# Patient Record
Sex: MALE | Race: WHITE | Employment: UNEMPLOYED
[De-identification: names, ages, dates, MRNs, and addresses within clinical notes are randomized per-mention and may not be internally consistent; named-entity substitution may affect disease eponyms.]

---

## 2024-10-21 ENCOUNTER — APPOINTMENT (OUTPATIENT)
Facility: HOSPITAL | Age: 74
DRG: 044 | End: 2024-10-21
Payer: COMMERCIAL

## 2024-10-21 ENCOUNTER — HOSPITAL ENCOUNTER (INPATIENT)
Facility: HOSPITAL | Age: 74
LOS: 7 days | Discharge: NURSING, REHAB OR LONG-TERM CARE | DRG: 044 | End: 2024-10-28
Attending: STUDENT IN AN ORGANIZED HEALTH CARE EDUCATION/TRAINING PROGRAM | Admitting: INTERNAL MEDICINE
Payer: COMMERCIAL

## 2024-10-21 DIAGNOSIS — I61.9 NONTRAUMATIC INTRACEREBRAL HEMORRHAGE, UNSPECIFIED CEREBRAL LOCATION, UNSPECIFIED LATERALITY (HCC): Primary | ICD-10-CM

## 2024-10-21 DIAGNOSIS — I48.0 PAROXYSMAL ATRIAL FIBRILLATION (HCC): ICD-10-CM

## 2024-10-21 DIAGNOSIS — I63.9 CEREBROVASCULAR ACCIDENT (CVA), UNSPECIFIED MECHANISM (HCC): ICD-10-CM

## 2024-10-21 PROBLEM — I61.0 THALAMIC HEMORRHAGE (HCC): Status: ACTIVE | Noted: 2024-10-21

## 2024-10-21 LAB
ALBUMIN SERPL-MCNC: 3.9 G/DL (ref 3.5–5)
ALBUMIN/GLOB SERPL: 0.9 (ref 1.1–2.2)
ALP SERPL-CCNC: 86 U/L (ref 45–117)
ALT SERPL-CCNC: 26 U/L (ref 12–78)
ANION GAP SERPL CALC-SCNC: 3 MMOL/L (ref 2–12)
APTT PPP: 29.2 SEC (ref 22.1–31)
AST SERPL-CCNC: 24 U/L (ref 15–37)
BASOPHILS # BLD: 0.1 K/UL (ref 0–0.1)
BASOPHILS NFR BLD: 0 % (ref 0–1)
BILIRUB SERPL-MCNC: 0.7 MG/DL (ref 0.2–1)
BUN SERPL-MCNC: 14 MG/DL (ref 6–20)
BUN/CREAT SERPL: 11 (ref 12–20)
CALCIUM SERPL-MCNC: 9.4 MG/DL (ref 8.5–10.1)
CHLORIDE SERPL-SCNC: 106 MMOL/L (ref 97–108)
CO2 SERPL-SCNC: 26 MMOL/L (ref 21–32)
CREAT SERPL-MCNC: 1.32 MG/DL (ref 0.7–1.3)
DIFFERENTIAL METHOD BLD: ABNORMAL
EKG ATRIAL RATE: 98 BPM
EKG DIAGNOSIS: NORMAL
EKG P AXIS: 37 DEGREES
EKG P-R INTERVAL: 126 MS
EKG Q-T INTERVAL: 382 MS
EKG QRS DURATION: 78 MS
EKG QTC CALCULATION (BAZETT): 487 MS
EKG R AXIS: 3 DEGREES
EKG T AXIS: 44 DEGREES
EKG VENTRICULAR RATE: 98 BPM
EOSINOPHIL # BLD: 0.1 K/UL (ref 0–0.4)
EOSINOPHIL NFR BLD: 0 % (ref 0–7)
ERYTHROCYTE [DISTWIDTH] IN BLOOD BY AUTOMATED COUNT: 15.7 % (ref 11.5–14.5)
ETHANOL SERPL-MCNC: <10 MG/DL (ref 0–0.08)
GLOBULIN SER CALC-MCNC: 4.4 G/DL (ref 2–4)
GLUCOSE SERPL-MCNC: 97 MG/DL (ref 65–100)
HCT VFR BLD AUTO: 43.9 % (ref 36.6–50.3)
HGB BLD-MCNC: 14.6 G/DL (ref 12.1–17)
IMM GRANULOCYTES # BLD AUTO: 0 K/UL (ref 0–0.04)
IMM GRANULOCYTES NFR BLD AUTO: 0 % (ref 0–0.5)
INR PPP: 1.1 (ref 0.9–1.1)
LYMPHOCYTES # BLD: 1.8 K/UL (ref 0.8–3.5)
LYMPHOCYTES NFR BLD: 16 % (ref 12–49)
MCH RBC QN AUTO: 29 PG (ref 26–34)
MCHC RBC AUTO-ENTMCNC: 33.3 G/DL (ref 30–36.5)
MCV RBC AUTO: 87.3 FL (ref 80–99)
MONOCYTES # BLD: 0.8 K/UL (ref 0–1)
MONOCYTES NFR BLD: 7 % (ref 5–13)
NEUTS SEG # BLD: 8.6 K/UL (ref 1.8–8)
NEUTS SEG NFR BLD: 77 % (ref 32–75)
NRBC # BLD: 0 K/UL (ref 0–0.01)
NRBC BLD-RTO: 0 PER 100 WBC
PLATELET # BLD AUTO: 190 K/UL (ref 150–400)
PMV BLD AUTO: 10.8 FL (ref 8.9–12.9)
POTASSIUM SERPL-SCNC: 4.2 MMOL/L (ref 3.5–5.1)
PROT SERPL-MCNC: 8.3 G/DL (ref 6.4–8.2)
PROTHROMBIN TIME: 11 SEC (ref 9–11.1)
RBC # BLD AUTO: 5.03 M/UL (ref 4.1–5.7)
SODIUM SERPL-SCNC: 135 MMOL/L (ref 136–145)
THERAPEUTIC RANGE: NORMAL SECS (ref 58–77)
WBC # BLD AUTO: 11.3 K/UL (ref 4.1–11.1)

## 2024-10-21 PROCEDURE — 82077 ASSAY SPEC XCP UR&BREATH IA: CPT

## 2024-10-21 PROCEDURE — 2580000003 HC RX 258: Performed by: NURSE PRACTITIONER

## 2024-10-21 PROCEDURE — 93005 ELECTROCARDIOGRAM TRACING: CPT | Performed by: INTERNAL MEDICINE

## 2024-10-21 PROCEDURE — 93010 ELECTROCARDIOGRAM REPORT: CPT | Performed by: INTERNAL MEDICINE

## 2024-10-21 PROCEDURE — 6360000002 HC RX W HCPCS: Performed by: STUDENT IN AN ORGANIZED HEALTH CARE EDUCATION/TRAINING PROGRAM

## 2024-10-21 PROCEDURE — APPNB45 APP NON BILLABLE 31-45 MINUTES

## 2024-10-21 PROCEDURE — 85025 COMPLETE CBC W/AUTO DIFF WBC: CPT

## 2024-10-21 PROCEDURE — 93005 ELECTROCARDIOGRAM TRACING: CPT | Performed by: STUDENT IN AN ORGANIZED HEALTH CARE EDUCATION/TRAINING PROGRAM

## 2024-10-21 PROCEDURE — 96374 THER/PROPH/DIAG INJ IV PUSH: CPT

## 2024-10-21 PROCEDURE — 99285 EMERGENCY DEPT VISIT HI MDM: CPT

## 2024-10-21 PROCEDURE — 36415 COLL VENOUS BLD VENIPUNCTURE: CPT

## 2024-10-21 PROCEDURE — 85610 PROTHROMBIN TIME: CPT

## 2024-10-21 PROCEDURE — 2000000000 HC ICU R&B

## 2024-10-21 PROCEDURE — 80053 COMPREHEN METABOLIC PANEL: CPT

## 2024-10-21 PROCEDURE — 70450 CT HEAD/BRAIN W/O DYE: CPT

## 2024-10-21 PROCEDURE — 85730 THROMBOPLASTIN TIME PARTIAL: CPT

## 2024-10-21 RX ORDER — SODIUM CHLORIDE 0.9 % (FLUSH) 0.9 %
5-40 SYRINGE (ML) INJECTION PRN
Status: DISCONTINUED | OUTPATIENT
Start: 2024-10-21 | End: 2024-10-28 | Stop reason: HOSPADM

## 2024-10-21 RX ORDER — SODIUM CHLORIDE 9 MG/ML
INJECTION, SOLUTION INTRAVENOUS CONTINUOUS
Status: DISCONTINUED | OUTPATIENT
Start: 2024-10-21 | End: 2024-10-22

## 2024-10-21 RX ORDER — ACETAMINOPHEN 325 MG/1
650 TABLET ORAL EVERY 6 HOURS PRN
Status: DISCONTINUED | OUTPATIENT
Start: 2024-10-21 | End: 2024-10-28 | Stop reason: HOSPADM

## 2024-10-21 RX ORDER — ACETAMINOPHEN 650 MG/1
650 SUPPOSITORY RECTAL EVERY 6 HOURS PRN
Status: DISCONTINUED | OUTPATIENT
Start: 2024-10-21 | End: 2024-10-28 | Stop reason: HOSPADM

## 2024-10-21 RX ORDER — ONDANSETRON 2 MG/ML
4 INJECTION INTRAMUSCULAR; INTRAVENOUS EVERY 6 HOURS PRN
Status: DISCONTINUED | OUTPATIENT
Start: 2024-10-21 | End: 2024-10-28 | Stop reason: HOSPADM

## 2024-10-21 RX ORDER — SODIUM CHLORIDE 0.9 % (FLUSH) 0.9 %
5-40 SYRINGE (ML) INJECTION EVERY 12 HOURS SCHEDULED
Status: DISCONTINUED | OUTPATIENT
Start: 2024-10-21 | End: 2024-10-28 | Stop reason: HOSPADM

## 2024-10-21 RX ORDER — LABETALOL HYDROCHLORIDE 5 MG/ML
10 INJECTION, SOLUTION INTRAVENOUS EVERY 4 HOURS PRN
Status: DISCONTINUED | OUTPATIENT
Start: 2024-10-21 | End: 2024-10-28 | Stop reason: HOSPADM

## 2024-10-21 RX ORDER — SODIUM CHLORIDE 9 MG/ML
INJECTION, SOLUTION INTRAVENOUS PRN
Status: DISCONTINUED | OUTPATIENT
Start: 2024-10-21 | End: 2024-10-28 | Stop reason: HOSPADM

## 2024-10-21 RX ADMIN — NICARDIPINE HYDROCHLORIDE 10 MG/HR: 0.1 INJECTION, SOLUTION INTRAVENOUS at 23:20

## 2024-10-21 RX ADMIN — NICARDIPINE HYDROCHLORIDE 5 MG/HR: 0.1 INJECTION, SOLUTION INTRAVENOUS at 20:56

## 2024-10-21 RX ADMIN — SODIUM CHLORIDE: 9 INJECTION, SOLUTION INTRAVENOUS at 22:31

## 2024-10-21 NOTE — ED TRIAGE NOTES
Patient arrives via EMS via homeless shelter for \"seizure\" like activities. Patient would be laying \"stiff\" and stop and then start talking to staff. No post dictal period noted. Patient denies any complaints. Transported via EMS due to shelter telling him he needed to get his HTN checked.

## 2024-10-22 ENCOUNTER — APPOINTMENT (OUTPATIENT)
Facility: HOSPITAL | Age: 74
DRG: 044 | End: 2024-10-22
Payer: COMMERCIAL

## 2024-10-22 PROBLEM — I63.9 ISCHEMIC STROKE (HCC): Status: ACTIVE | Noted: 2024-10-22

## 2024-10-22 PROBLEM — I10 UNCONTROLLED HYPERTENSION: Status: ACTIVE | Noted: 2024-10-22

## 2024-10-22 PROBLEM — E78.5 HYPERLIPIDEMIA: Status: ACTIVE | Noted: 2024-10-22

## 2024-10-22 PROBLEM — I61.9 NONTRAUMATIC INTRACEREBRAL HEMORRHAGE (HCC): Status: ACTIVE | Noted: 2024-10-22

## 2024-10-22 LAB
AMPHET UR QL SCN: NEGATIVE
ANION GAP SERPL CALC-SCNC: 2 MMOL/L (ref 2–12)
APPEARANCE UR: CLEAR
BACTERIA URNS QL MICRO: NEGATIVE /HPF
BARBITURATES UR QL SCN: NEGATIVE
BENZODIAZ UR QL: NEGATIVE
BILIRUB UR QL: NEGATIVE
BUN SERPL-MCNC: 11 MG/DL (ref 6–20)
BUN/CREAT SERPL: 9 (ref 12–20)
CALCIUM SERPL-MCNC: 8.8 MG/DL (ref 8.5–10.1)
CANNABINOIDS UR QL SCN: NEGATIVE
CHLORIDE SERPL-SCNC: 110 MMOL/L (ref 97–108)
CHOLEST SERPL-MCNC: 176 MG/DL
CO2 SERPL-SCNC: 26 MMOL/L (ref 21–32)
COCAINE UR QL SCN: NEGATIVE
COLOR UR: NORMAL
CREAT SERPL-MCNC: 1.23 MG/DL (ref 0.7–1.3)
ECHO AV AREA PEAK VELOCITY: 2.3 CM2
ECHO AV AREA/BSA PEAK VELOCITY: 1.3 CM2/M2
ECHO AV PEAK GRADIENT: 10 MMHG
ECHO AV PEAK VELOCITY: 1.6 M/S
ECHO AV VELOCITY RATIO: 0.75
ECHO BSA: 1.83 M2
ECHO LA DIAMETER INDEX: 1.8 CM/M2
ECHO LA DIAMETER: 3.2 CM
ECHO LA VOL A-L A4C: 59 ML (ref 18–58)
ECHO LA VOL MOD A4C: 59 ML (ref 18–58)
ECHO LA VOLUME INDEX A-L A4C: 33 ML/M2 (ref 16–34)
ECHO LA VOLUME INDEX MOD A4C: 33 ML/M2 (ref 16–34)
ECHO LV E' LATERAL VELOCITY: 8.3 CM/S
ECHO LV E' SEPTAL VELOCITY: 6.3 CM/S
ECHO LV EF PHYSICIAN: 55 %
ECHO LV FRACTIONAL SHORTENING: 26 % (ref 28–44)
ECHO LV INTERNAL DIMENSION DIASTOLE INDEX: 2.64 CM/M2
ECHO LV INTERNAL DIMENSION DIASTOLIC: 4.7 CM (ref 4.2–5.9)
ECHO LV INTERNAL DIMENSION SYSTOLIC INDEX: 1.97 CM/M2
ECHO LV INTERNAL DIMENSION SYSTOLIC: 3.5 CM
ECHO LV IVSD: 1.1 CM (ref 0.6–1)
ECHO LV MASS 2D: 187.5 G (ref 88–224)
ECHO LV MASS INDEX 2D: 105.4 G/M2 (ref 49–115)
ECHO LV POSTERIOR WALL DIASTOLIC: 1.1 CM (ref 0.6–1)
ECHO LV RELATIVE WALL THICKNESS RATIO: 0.47
ECHO LVOT AREA: 2.8 CM2
ECHO LVOT DIAM: 1.9 CM
ECHO LVOT PEAK GRADIENT: 6 MMHG
ECHO LVOT PEAK VELOCITY: 1.2 M/S
ECHO MV A VELOCITY: 0.84 M/S
ECHO MV E VELOCITY: 0.5 M/S
ECHO MV E/A RATIO: 0.6
ECHO MV E/E' LATERAL: 6.02
ECHO MV E/E' RATIO (AVERAGED): 6.98
ECHO MV E/E' SEPTAL: 7.94
ECHO MV REGURGITANT PEAK GRADIENT: 96 MMHG
ECHO MV REGURGITANT PEAK VELOCITY: 4.9 M/S
ECHO RV INTERNAL DIMENSION: 3 CM
ECHO RV TAPSE: 2.6 CM (ref 1.7–?)
EKG ATRIAL RATE: 111 BPM
EKG DIAGNOSIS: NORMAL
EKG P AXIS: 56 DEGREES
EKG P-R INTERVAL: 128 MS
EKG Q-T INTERVAL: 318 MS
EKG QRS DURATION: 88 MS
EKG QTC CALCULATION (BAZETT): 432 MS
EKG R AXIS: 44 DEGREES
EKG T AXIS: 90 DEGREES
EKG VENTRICULAR RATE: 111 BPM
EPITH CASTS URNS QL MICRO: NORMAL /LPF
ERYTHROCYTE [DISTWIDTH] IN BLOOD BY AUTOMATED COUNT: 15.4 % (ref 11.5–14.5)
EST. AVERAGE GLUCOSE BLD GHB EST-MCNC: 117 MG/DL
GLUCOSE SERPL-MCNC: 117 MG/DL (ref 65–100)
GLUCOSE UR STRIP.AUTO-MCNC: NEGATIVE MG/DL
HBA1C MFR BLD: 5.7 % (ref 4–5.6)
HCT VFR BLD AUTO: 40.9 % (ref 36.6–50.3)
HDLC SERPL-MCNC: 53 MG/DL
HDLC SERPL: 3.3 (ref 0–5)
HGB BLD-MCNC: 13.9 G/DL (ref 12.1–17)
HGB UR QL STRIP: NEGATIVE
HYALINE CASTS URNS QL MICRO: NORMAL /LPF (ref 0–5)
KETONES UR QL STRIP.AUTO: NEGATIVE MG/DL
LDLC SERPL CALC-MCNC: 101.4 MG/DL (ref 0–100)
LEUKOCYTE ESTERASE UR QL STRIP.AUTO: NEGATIVE
Lab: NORMAL
MCH RBC QN AUTO: 29 PG (ref 26–34)
MCHC RBC AUTO-ENTMCNC: 34 G/DL (ref 30–36.5)
MCV RBC AUTO: 85.4 FL (ref 80–99)
METHADONE UR QL: NEGATIVE
NITRITE UR QL STRIP.AUTO: NEGATIVE
NRBC # BLD: 0 K/UL (ref 0–0.01)
NRBC BLD-RTO: 0 PER 100 WBC
OPIATES UR QL: NEGATIVE
PCP UR QL: NEGATIVE
PH UR STRIP: 7 (ref 5–8)
PLATELET # BLD AUTO: 194 K/UL (ref 150–400)
PMV BLD AUTO: 11.2 FL (ref 8.9–12.9)
POTASSIUM SERPL-SCNC: 5.1 MMOL/L (ref 3.5–5.1)
PROT UR STRIP-MCNC: NEGATIVE MG/DL
RBC # BLD AUTO: 4.79 M/UL (ref 4.1–5.7)
RBC #/AREA URNS HPF: NORMAL /HPF (ref 0–5)
SODIUM SERPL-SCNC: 138 MMOL/L (ref 136–145)
SP GR UR REFRACTOMETRY: 1.01 (ref 1–1.03)
TRIGL SERPL-MCNC: 108 MG/DL
TSH SERPL DL<=0.05 MIU/L-ACNC: 0.92 UIU/ML (ref 0.36–3.74)
URINE CULTURE IF INDICATED: NORMAL
UROBILINOGEN UR QL STRIP.AUTO: 0.2 EU/DL (ref 0.2–1)
VLDLC SERPL CALC-MCNC: 21.6 MG/DL
WBC # BLD AUTO: 10.4 K/UL (ref 4.1–11.1)
WBC URNS QL MICRO: NORMAL /HPF (ref 0–4)

## 2024-10-22 PROCEDURE — 93010 ELECTROCARDIOGRAM REPORT: CPT | Performed by: SPECIALIST

## 2024-10-22 PROCEDURE — 85027 COMPLETE CBC AUTOMATED: CPT

## 2024-10-22 PROCEDURE — 92522 EVALUATE SPEECH PRODUCTION: CPT

## 2024-10-22 PROCEDURE — 83036 HEMOGLOBIN GLYCOSYLATED A1C: CPT

## 2024-10-22 PROCEDURE — 93306 TTE W/DOPPLER COMPLETE: CPT | Performed by: SPECIALIST

## 2024-10-22 PROCEDURE — 81001 URINALYSIS AUTO W/SCOPE: CPT

## 2024-10-22 PROCEDURE — 70551 MRI BRAIN STEM W/O DYE: CPT

## 2024-10-22 PROCEDURE — 80307 DRUG TEST PRSMV CHEM ANLYZR: CPT

## 2024-10-22 PROCEDURE — 6360000002 HC RX W HCPCS

## 2024-10-22 PROCEDURE — 97165 OT EVAL LOW COMPLEX 30 MIN: CPT

## 2024-10-22 PROCEDURE — 2580000003 HC RX 258: Performed by: NURSE PRACTITIONER

## 2024-10-22 PROCEDURE — 70450 CT HEAD/BRAIN W/O DYE: CPT

## 2024-10-22 PROCEDURE — 97112 NEUROMUSCULAR REEDUCATION: CPT

## 2024-10-22 PROCEDURE — APPNB45 APP NON BILLABLE 31-45 MINUTES

## 2024-10-22 PROCEDURE — 2060000000 HC ICU INTERMEDIATE R&B

## 2024-10-22 PROCEDURE — 2500000003 HC RX 250 WO HCPCS: Performed by: NURSE PRACTITIONER

## 2024-10-22 PROCEDURE — 92610 EVALUATE SWALLOWING FUNCTION: CPT

## 2024-10-22 PROCEDURE — 99223 1ST HOSP IP/OBS HIGH 75: CPT | Performed by: PSYCHIATRY & NEUROLOGY

## 2024-10-22 PROCEDURE — 80048 BASIC METABOLIC PNL TOTAL CA: CPT

## 2024-10-22 PROCEDURE — 97161 PT EVAL LOW COMPLEX 20 MIN: CPT

## 2024-10-22 PROCEDURE — 6360000002 HC RX W HCPCS: Performed by: STUDENT IN AN ORGANIZED HEALTH CARE EDUCATION/TRAINING PROGRAM

## 2024-10-22 PROCEDURE — 36415 COLL VENOUS BLD VENIPUNCTURE: CPT

## 2024-10-22 PROCEDURE — 84443 ASSAY THYROID STIM HORMONE: CPT

## 2024-10-22 PROCEDURE — 6370000000 HC RX 637 (ALT 250 FOR IP): Performed by: PSYCHIATRY & NEUROLOGY

## 2024-10-22 PROCEDURE — 93306 TTE W/DOPPLER COMPLETE: CPT

## 2024-10-22 PROCEDURE — 6360000002 HC RX W HCPCS: Performed by: NURSE PRACTITIONER

## 2024-10-22 PROCEDURE — 80061 LIPID PANEL: CPT

## 2024-10-22 PROCEDURE — 97535 SELF CARE MNGMENT TRAINING: CPT

## 2024-10-22 RX ORDER — LEVETIRACETAM 500 MG/5ML
500 INJECTION, SOLUTION, CONCENTRATE INTRAVENOUS EVERY 12 HOURS
Status: DISCONTINUED | OUTPATIENT
Start: 2024-10-22 | End: 2024-10-22

## 2024-10-22 RX ORDER — ATORVASTATIN CALCIUM 40 MG/1
40 TABLET, FILM COATED ORAL NIGHTLY
Status: DISCONTINUED | OUTPATIENT
Start: 2024-10-22 | End: 2024-10-28 | Stop reason: HOSPADM

## 2024-10-22 RX ADMIN — NICARDIPINE HYDROCHLORIDE 12.5 MG/HR: 0.1 INJECTION, SOLUTION INTRAVENOUS at 00:05

## 2024-10-22 RX ADMIN — LEVETIRACETAM 500 MG: 100 INJECTION INTRAVENOUS at 01:14

## 2024-10-22 RX ADMIN — SODIUM CHLORIDE, PRESERVATIVE FREE 10 ML: 5 INJECTION INTRAVENOUS at 20:37

## 2024-10-22 RX ADMIN — NICARDIPINE HYDROCHLORIDE 7.5 MG/HR: 0.1 INJECTION, SOLUTION INTRAVENOUS at 06:03

## 2024-10-22 RX ADMIN — LEVETIRACETAM 500 MG: 100 INJECTION INTRAVENOUS at 12:26

## 2024-10-22 RX ADMIN — NICARDIPINE HYDROCHLORIDE 15 MG/HR: 0.1 INJECTION, SOLUTION INTRAVENOUS at 00:53

## 2024-10-22 RX ADMIN — LABETALOL HYDROCHLORIDE 10 MG: 5 INJECTION INTRAVENOUS at 00:05

## 2024-10-22 RX ADMIN — FAMOTIDINE 20 MG: 10 INJECTION, SOLUTION INTRAVENOUS at 00:48

## 2024-10-22 RX ADMIN — ATORVASTATIN CALCIUM 40 MG: 40 TABLET, FILM COATED ORAL at 20:36

## 2024-10-22 RX ADMIN — SODIUM CHLORIDE, PRESERVATIVE FREE 10 ML: 5 INJECTION INTRAVENOUS at 08:28

## 2024-10-22 RX ADMIN — NICARDIPINE HYDROCHLORIDE 14 MG/HR: 0.1 INJECTION, SOLUTION INTRAVENOUS at 02:21

## 2024-10-22 RX ADMIN — NICARDIPINE HYDROCHLORIDE 13 MG/HR: 0.1 INJECTION, SOLUTION INTRAVENOUS at 03:56

## 2024-10-22 ASSESSMENT — PAIN DESCRIPTION - PAIN TYPE: TYPE: ACUTE PAIN

## 2024-10-22 ASSESSMENT — PAIN SCALES - GENERAL
PAINLEVEL_OUTOF10: 0
PAINLEVEL_OUTOF10: 4
PAINLEVEL_OUTOF10: 0
PAINLEVEL_OUTOF10: 0

## 2024-10-22 ASSESSMENT — PAIN DESCRIPTION - ORIENTATION: ORIENTATION: LEFT

## 2024-10-22 ASSESSMENT — PAIN DESCRIPTION - ONSET: ONSET: ON-GOING

## 2024-10-22 ASSESSMENT — PAIN DESCRIPTION - DESCRIPTORS: DESCRIPTORS: ACHING

## 2024-10-22 ASSESSMENT — PAIN - FUNCTIONAL ASSESSMENT: PAIN_FUNCTIONAL_ASSESSMENT: ACTIVITIES ARE NOT PREVENTED

## 2024-10-22 ASSESSMENT — PAIN DESCRIPTION - LOCATION: LOCATION: HEAD

## 2024-10-22 ASSESSMENT — PAIN DESCRIPTION - FREQUENCY: FREQUENCY: CONTINUOUS

## 2024-10-22 NOTE — H&P
Name: Jason Soliman   : 1950   MRN: [id number]   Date: 10/22/2024      REASON FOR ICU ADMISSION: ICH     PRINCIPLE ICU DIAGNOSIS     Acute left thalamic hemorrhage  HTN emergency   Seizure-like activity   Current Smoker    PATIENT SUMMARY   Jason Soliman is a 74 y.o. male with a history of HTN     This is a 74 y.o. male with pmhx HTN who \"presents today for cc of AMS. Patient states that he has been having random falls since yesterday. Per EMS at the homeless shelter he had an episode of stiffening and acted abnormally, then was acting normal. They took his blood pressure and noted it was elevated and recommended that he come in.  Patient states that he otherwise has no complaints, no headache, chest pain.  He did not lose control of bowel or bladder during the episode and was not postictal per EMS.  He did have another fall today, but denies any his head stating \"I just fell over.\"  Denies prodrome or presyncope.  No loss of consciousness.  Takes no blood thinners.\" Head CT was obtained and reported: Acute left thalamic hemorrhage. Patient was started on Cardene drip for blood pressure control. Neurosurgery was consulted and reviewed image, no surgical intervention was recommended. Critical care was consulted for admission. Admit to ICU. Upon further interview patient sates that he has had an intermittent headache that started about a week ago. Has been taking ibuprofen for the headache, but kept coming back which was unusual for him. Currently states his headache is still present but slight 2/10 on pain scale. No other complaint of pain. No nausea or vomiting. No blood in bowel movement or urine. States that he has had chills at night, but no fever. Not recently sick or around sick contacts.     Patient states that has been told before that he has HTN but does not take any medications. States that a few times he has also been told his blood sugars were elevated but not diagnosed with any diabetes. Denies

## 2024-10-22 NOTE — ED PROVIDER NOTES
[facility] EMERGENCY DEP  EMERGENCY DEPARTMENT ENCOUNTER      Pt Name: Jason Soliman  MRN: [id number]  Birthdate 1950  Date of evaluation: 10/21/2024  Provider: Carmen Baker DO    CHIEF COMPLAINT       Chief Complaint   Patient presents with    Altered Mental Status    Hypertension       PM No past medical history on file.      MDM:   Vitals:    Vitals:    10/21/24 2012   BP: (!) 171/114   Pulse:    Resp:    Temp:    SpO2:            This is a 74 y.o. male with pmhx homelessness who presents today for cc of AMS. Patient states that he has been having random falls since yesterday. Per EMS at the homeless shelter he had an episode of stiffening and acted abnormally, then was acting normal. They took his blood pressure and noted it was elevated and recommended that he come in.  Patient states that he otherwise has no complaints, no headache, chest pain.  He did not lose control of bowel or bladder during the episode and was not postictal per EMS.  He did have another fall today but denies any his head stating \"I just fell over.\"  Denies prodrome or presyncope.  No loss of consciousness.  Takes no blood thinners.    On arrival VS stable.   Physical Exam  General: Alert, no acute distress  HEENT: Normocephalic, atraumatic. EOMI, moist oral mucosa, no conjunctival injection  Neck: ROM normal, supple  Cardio: Heart regular rate and rhythm, cap refill <2seconds  Lungs: No respiratory distress, no wheezing, CTAB  Abdomen: Soft, nontender  MSK: ROM normal, no LE edema  Skin: Warm, dry, no rash  Neuro: No focal neurodeficits, Aox3, NIH 0    Labs grossly unremarkable, EKG nonischemic.  CT head obtained showing small left thalamic bleed.    Code stroke level 1 called,teleneuro recommends SBP between 130 and 150 and nicardipine was started as his current systolic is 170. Discussed with Dr. Wang of neurosurgery who recommends ICU admission for q1h neurochecks and rpt HCT in AM. ICU made aware and coming to bedside for

## 2024-10-22 NOTE — CARE COORDINATION
Care Management Initial Assessment       RUR: 10 % Low   Readmission? No     10/22/24 0941   Service Assessment   Patient Orientation Alert and Oriented;Person;Place;Situation;Self   Cognition Alert   History Provided By Patient   Primary Caregiver Self   Support Systems Family Members  (Brother Scout Alfaro [phone])   Patient's Healthcare Decision Maker is: Legal Next of Kin   PCP Verified by CM Yes  (Per patient he saw someone at [facility], does not recall the name)   Last Visit to PCP Within last year   Prior Functional Level Independent in ADLs/IADLs   Current Functional Level Assistance with the following:;Bathing;Dressing;Toileting;Mobility   Can patient return to prior living arrangement Unknown at present   Ability to make needs known: Fair   Family able to assist with home care needs: No   Would you like for me to discuss the discharge plan with any other family members/significant others, and if so, who? Yes  (Brother Scout Alfaro)   Financial Resources Medicaid  ([employer])   Social/Functional History   Lives With Other (comment)  (Lives at the [facility])   Type of Home Facility   Home Equipment None   Receives Help From Family   ADL Assistance Independent   Homemaking Assistance Independent   Ambulation Assistance Independent   Transfer Assistance Independent   Active  No   Patient's  Info Takes the bus , friends and brother also transport   Mode of Transportation Bus;Car   Discharge Planning   Living Arrangements Other (Comment)  ([facility] [address] [address])   Current Services Prior To Admission None   Potential Assistance Purchasing Medications No   Type of Home Care Services None     Patient presented to the ED from [facility] at [address]. Phone [phone] with seizure like activity. CT: acute left thalamic hemorrhage per notes surgery not indicated.   CM met with patient to introduce self and explain role. Per patient

## 2024-10-22 NOTE — H&P
History and Physical    Date of Service:  10/22/2024  Primary Care Provider: No primary care provider on file.  Source of information: The patient and Chart review    Chief Complaint: Altered Mental Status and Hypertension      History of Presenting Illness:   Jason Soliman is a 74 y.o. male with HTN and tobacco use disorder who was [facility] ICU under CCM service from a homeless shelter on 10/21/2024 with acute left thalamic ICH, hypertensive emergency, seizure-like activity. NSGY was consulted in the ED and recommended non-surgical management with repeat CT head. Neurology was consulted. He was started on Keppra 500 BID. SLP recommended regular diet with thin liquids. Repeat head CT 10/22 showed stable L thalamic ICH. NSGY signed off today. OT recommended IPR. PT eval is pending. MRI brain wo contrast 10/22 report is pending. TTE 10/22 showed EF 55-60%, NWM, abnormal diastolic function, mild MR/TR and negative bubble study. Pt is s/p nicardipine gtt 10/21-10/22. BP has improved.  was consulted for transfer of care today.    Pt was seen/examined at bedside in ICU. He had MRI today. He reports continued slurred speech, L facial droop, L-sided weakness. BP is much improved.      REVIEW OF SYSTEMS:  Pertinent items are noted in the History of Present Illness.     Medical hx  HTN     No past surgical history on file.    Prior to Admission medications    Not on File     No Known Allergies     No family history on file.     Social History:   Pt admits to smoking cigarettes.   Social Determinants of Health     Tobacco Use: Not on file   Alcohol Use: Not on file   Financial Resource Strain: Not on file   Food Insecurity: Not on file   Transportation Needs: Not on file   Physical Activity: Not on file   Stress: Not on file   Social Connections: Not on file   Intimate Partner Violence: Not on file   Depression: Not on file   Housing Stability: Not on file   Interpersonal Safety: Not on file   Utilities: Not

## 2024-10-22 NOTE — ED NOTES
This RN was helping Patricia RN with pts heart rate. Pts heart rate increased to 156. Repeat EKG performed. Pt denies CP or SOB. MD aware.

## 2024-10-22 NOTE — ED NOTES
POST FALL MANAGEMENT    Jason Soliman  MEDICAL RECORD NUMBER:  [id number]  AGE: 74 y.o.   GENDER: male  : 1950  TODAYS DATE:  10/21/2024    Details     Fall Occurred: Yes    Was the Fall Witnessed:  No      Brief Review of Event: Patient was discovered outside emergency department by ED Techs in the bushes.          Who found the patient: Patito       Where was the patient at the time of the fall: in the [facility] outside the emergency department       Patient Comments: no comments        Date Fall Occurred:  2024 .       Time Fall Occurred: 7:45p.m.     Assessment     Post Fall Head to Toe Assessment Completed: Yes    Post Fall Predictive Analytic Score Reviewed: No: n/a      Post Fall Vitals Completed: Yes    Post Fall Neuro Checks Completed: Yes    Injury Occurred(if yes, describe injury):  no ( patient denied any injuries)            Did the Patient Experience:(Check Bony all that apply)    ---- patient denies hitting head and LOC   [] Patient hit head  [] Loss of consciousness  [] Change in mental status following the fall  [] Patient is on an anticoagulant medication      CT Performed:  yes    Follow-up     Persons Notified of Fall:  (Provide names of persons notified)   [x] Physician:   [] RUTH:  [x] Nursing Supervisior:  [] Manager:  [] Pharmacist:  [] Family:  [] Other:      Electronically signed by Jun Cuello RN 10/21/2024 at 9:27 PM

## 2024-10-22 NOTE — ED NOTES
TRANSFER - OUT REPORT:    Verbal report given to jared Ramirez  on Jason Soliman  being transferred to [facility] for routine progression of patient care       Report consisted of patient's Situation, Background, Assessment and   Recommendations(SBAR).     Information from the following report(s) Nurse Handoff Report, ED Encounter Summary, Adult Overview, Intake/Output, MAR, Recent Results, Cardiac Rhythm Sinus tach, and Neuro Assessment was reviewed with the receiving nurse.    [place] Fall Assessment:    Presents to emergency department  because of falls (Syncope, seizure, or loss of consciousness): Yes  Age > 70: Yes  Altered Mental Status, Intoxication with alcohol or substance confusion (Disorientation, impaired judgment, poor safety awaremess, or inability to follow instructions): No  Impaired Mobility: Ambulates or transfers with assistive devices or assistance; Unable to ambulate or transer.: No  Nursing Judgement: Yes          Lines:   Peripheral IV 10/21/24 Left;Posterior Hand (Active)       Peripheral IV 10/21/24 Right Forearm (Active)        Opportunity for questions and clarification was provided.      Patient transported with:  Monitor and Registered Nurse

## 2024-10-23 ENCOUNTER — APPOINTMENT (OUTPATIENT)
Facility: HOSPITAL | Age: 74
DRG: 044 | End: 2024-10-23
Payer: COMMERCIAL

## 2024-10-23 ENCOUNTER — TELEPHONE (OUTPATIENT)
Facility: HOSPITAL | Age: 74
End: 2024-10-23

## 2024-10-23 LAB
ANION GAP SERPL CALC-SCNC: 7 MMOL/L (ref 2–12)
BUN SERPL-MCNC: 12 MG/DL (ref 6–20)
BUN/CREAT SERPL: 9 (ref 12–20)
CALCIUM SERPL-MCNC: 9 MG/DL (ref 8.5–10.1)
CHLORIDE SERPL-SCNC: 111 MMOL/L (ref 97–108)
CO2 SERPL-SCNC: 28 MMOL/L (ref 21–32)
CREAT SERPL-MCNC: 1.29 MG/DL (ref 0.7–1.3)
ERYTHROCYTE [DISTWIDTH] IN BLOOD BY AUTOMATED COUNT: 15.7 % (ref 11.5–14.5)
GLUCOSE SERPL-MCNC: 89 MG/DL (ref 65–100)
HCT VFR BLD AUTO: 36.9 % (ref 36.6–50.3)
HGB BLD-MCNC: 12.5 G/DL (ref 12.1–17)
MCH RBC QN AUTO: 28.9 PG (ref 26–34)
MCHC RBC AUTO-ENTMCNC: 33.9 G/DL (ref 30–36.5)
MCV RBC AUTO: 85.4 FL (ref 80–99)
NRBC # BLD: 0 K/UL (ref 0–0.01)
NRBC BLD-RTO: 0 PER 100 WBC
PLATELET # BLD AUTO: 162 K/UL (ref 150–400)
PMV BLD AUTO: 10.9 FL (ref 8.9–12.9)
POTASSIUM SERPL-SCNC: 3.5 MMOL/L (ref 3.5–5.1)
RBC # BLD AUTO: 4.32 M/UL (ref 4.1–5.7)
SODIUM SERPL-SCNC: 146 MMOL/L (ref 136–145)
WBC # BLD AUTO: 7 K/UL (ref 4.1–11.1)

## 2024-10-23 PROCEDURE — 97535 SELF CARE MNGMENT TRAINING: CPT

## 2024-10-23 PROCEDURE — 2060000000 HC ICU INTERMEDIATE R&B

## 2024-10-23 PROCEDURE — 70498 CT ANGIOGRAPHY NECK: CPT

## 2024-10-23 PROCEDURE — 6360000002 HC RX W HCPCS: Performed by: NURSE PRACTITIONER

## 2024-10-23 PROCEDURE — 95816 EEG AWAKE AND DROWSY: CPT

## 2024-10-23 PROCEDURE — 4A03X5D MEASUREMENT OF ARTERIAL FLOW, INTRACRANIAL, EXTERNAL APPROACH: ICD-10-PCS | Performed by: HOSPITALIST

## 2024-10-23 PROCEDURE — 6370000000 HC RX 637 (ALT 250 FOR IP): Performed by: PSYCHIATRY & NEUROLOGY

## 2024-10-23 PROCEDURE — 92526 ORAL FUNCTION THERAPY: CPT

## 2024-10-23 PROCEDURE — 92507 TX SP LANG VOICE COMM INDIV: CPT

## 2024-10-23 PROCEDURE — 2580000003 HC RX 258: Performed by: NURSE PRACTITIONER

## 2024-10-23 PROCEDURE — 6360000004 HC RX CONTRAST MEDICATION: Performed by: RADIOLOGY

## 2024-10-23 PROCEDURE — 85027 COMPLETE CBC AUTOMATED: CPT

## 2024-10-23 PROCEDURE — 95816 EEG AWAKE AND DROWSY: CPT | Performed by: PSYCHIATRY & NEUROLOGY

## 2024-10-23 PROCEDURE — 97530 THERAPEUTIC ACTIVITIES: CPT

## 2024-10-23 PROCEDURE — 6370000000 HC RX 637 (ALT 250 FOR IP): Performed by: HOSPITALIST

## 2024-10-23 PROCEDURE — 80048 BASIC METABOLIC PNL TOTAL CA: CPT

## 2024-10-23 PROCEDURE — 99232 SBSQ HOSP IP/OBS MODERATE 35: CPT | Performed by: NURSE PRACTITIONER

## 2024-10-23 RX ORDER — AMLODIPINE BESYLATE 5 MG/1
5 TABLET ORAL DAILY
Status: DISCONTINUED | OUTPATIENT
Start: 2024-10-23 | End: 2024-10-28 | Stop reason: HOSPADM

## 2024-10-23 RX ORDER — HYDRALAZINE HYDROCHLORIDE 20 MG/ML
10 INJECTION INTRAMUSCULAR; INTRAVENOUS EVERY 6 HOURS PRN
Status: DISCONTINUED | OUTPATIENT
Start: 2024-10-23 | End: 2024-10-28 | Stop reason: HOSPADM

## 2024-10-23 RX ORDER — LOSARTAN POTASSIUM 50 MG/1
25 TABLET ORAL DAILY
Status: DISCONTINUED | OUTPATIENT
Start: 2024-10-23 | End: 2024-10-28 | Stop reason: HOSPADM

## 2024-10-23 RX ORDER — IOPAMIDOL 755 MG/ML
100 INJECTION, SOLUTION INTRAVASCULAR
Status: COMPLETED | OUTPATIENT
Start: 2024-10-23 | End: 2024-10-23

## 2024-10-23 RX ADMIN — SODIUM CHLORIDE, PRESERVATIVE FREE 10 ML: 5 INJECTION INTRAVENOUS at 21:52

## 2024-10-23 RX ADMIN — LOSARTAN POTASSIUM 25 MG: 50 TABLET, FILM COATED ORAL at 14:07

## 2024-10-23 RX ADMIN — LABETALOL HYDROCHLORIDE 10 MG: 5 INJECTION INTRAVENOUS at 00:37

## 2024-10-23 RX ADMIN — HYDRALAZINE HYDROCHLORIDE 10 MG: 20 INJECTION INTRAMUSCULAR; INTRAVENOUS at 06:11

## 2024-10-23 RX ADMIN — SODIUM CHLORIDE, PRESERVATIVE FREE 20 ML: 5 INJECTION INTRAVENOUS at 09:41

## 2024-10-23 RX ADMIN — AMLODIPINE BESYLATE 5 MG: 5 TABLET ORAL at 14:07

## 2024-10-23 RX ADMIN — ATORVASTATIN CALCIUM 40 MG: 40 TABLET, FILM COATED ORAL at 21:50

## 2024-10-23 RX ADMIN — IOPAMIDOL 100 ML: 755 INJECTION, SOLUTION INTRAVENOUS at 16:11

## 2024-10-23 ASSESSMENT — PAIN SCALES - GENERAL: PAINLEVEL_OUTOF10: 0

## 2024-10-23 NOTE — TELEPHONE ENCOUNTER
Pt needs a hospital follow up appointment  Provider: Any  Location: [facility]   In person or virtual: In person  When: 4-6 weeks   Diagnosis/reason for follow up:  stroke  Additional information: patient was currently residing at [facility] and does not drive, f/u appt based on if brother can bring him

## 2024-10-23 NOTE — PROCEDURES
PROCEDURE: ROUTINE INPATIENT EEG  NAME:   Jason Soliman  ACCOUNT NUMBER : [id number]  MRN:   [id number]  DATE OF SERVICE: 10/23/2024     HISTORY/INDICATION: Patient is a 74-year-old male admitted with right basal ganglia ischemic stroke and left thalamic hemorrhagic stroke with a spell of stiffening and altered mental status witnessed just prior to admission.  EEG is performed to assess for evidence of underlying epilepsy.    MEDICATIONS:   Current Facility-Administered Medications   Medication Dose Route Frequency Provider Last Rate Last Admin    hydrALAZINE (APRESOLINE) injection 10 mg  10 mg IntraVENous Q6H PRN Keyla Cruz APRN - NP   10 mg at 10/23/24 0611    atorvastatin (LIPITOR) tablet 40 mg  40 mg Oral Nightly Ivette Francis MD   40 mg at 10/22/24 2036    sodium chloride flush 0.9 % injection 5-40 mL  5-40 mL IntraVENous 2 times per day Marcos Pat NP-C   20 mL at 10/23/24 0941    sodium chloride flush 0.9 % injection 5-40 mL  5-40 mL IntraVENous PRN Marcos Pat, NP-C        0.9 % sodium chloride infusion   IntraVENous PRN Marcos Pat, NP-C        acetaminophen (TYLENOL) tablet 650 mg  650 mg Oral Q6H PRN Marcos Pat NP-C        Or    acetaminophen (TYLENOL) suppository 650 mg  650 mg Rectal Q6H PRN Marcos Pat, NP-C        ondansetron (ZOFRAN) injection 4 mg  4 mg IntraVENous Q6H PRN Marcos Pat NP-C        labetalol (NORMODYNE;TRANDATE) injection 10 mg  10 mg IntraVENous Q4H PRN Marcos Pat, NP-C   10 mg at 10/23/24 0037       CONDITIONS OF RECORDING: This is a routine 21-channel EEG recording performed in accordance with the international 10-20 system with one channel devoted to limited EKG. This study was done during states of wakefulness and drowsiness.  No activating procedures were performed.      DESCRIPTION:   Upon maximal arousal the posterior dominant rhythm has a frequency of 9Hz with an amplitude of 20uV. This activity is symmetric over the

## 2024-10-23 NOTE — CARE COORDINATION
Transition of Care Plan:    IPR - Referral sent to [facility]; will require insurance auth through [employer]  - PM&R consulted    Transport: BLS    RUR: 14%  Prior Level of Functioning: independent - Pt is   Disposition: IPR  If SNF or IPR: Date FOC offered: 10/23  Date FOC received: 10/23  Accepting facility: Pending  Date authorization started with reference number:   Date authorization received and expires:   Follow up appointments: PCP, Neuro   DME needed: none  Transportation at discharge: Medicaid stretcher  Caregiver Contact: Scout Alfaro (Brother/Sister)  [phone] (Mobile)   Discharge Caregiver contacted prior to discharge?   Care Conference needed? No  Barriers to discharge: Placement/auth - referral to [facility] and will need insurance auth through [employer]    PT/OT and Hospitalist are recommending IPR at discharge. CM met with Pt at bedside to offer choice, prefers [facility]. Referral sent.     Pt stated he wanted to go back to [facility] upon DC from IPR.     ALLIE Corbin (Ally)S.OMERO.

## 2024-10-24 LAB
ANION GAP SERPL CALC-SCNC: 5 MMOL/L (ref 2–12)
BUN SERPL-MCNC: 20 MG/DL (ref 6–20)
BUN/CREAT SERPL: 15 (ref 12–20)
CALCIUM SERPL-MCNC: 9.4 MG/DL (ref 8.5–10.1)
CHLORIDE SERPL-SCNC: 111 MMOL/L (ref 97–108)
CO2 SERPL-SCNC: 28 MMOL/L (ref 21–32)
CREAT SERPL-MCNC: 1.3 MG/DL (ref 0.7–1.3)
ERYTHROCYTE [DISTWIDTH] IN BLOOD BY AUTOMATED COUNT: 15.5 % (ref 11.5–14.5)
GLUCOSE BLD STRIP.AUTO-MCNC: 88 MG/DL (ref 65–117)
GLUCOSE SERPL-MCNC: 106 MG/DL (ref 65–100)
HCT VFR BLD AUTO: 39.5 % (ref 36.6–50.3)
HGB BLD-MCNC: 13.3 G/DL (ref 12.1–17)
MCH RBC QN AUTO: 29 PG (ref 26–34)
MCHC RBC AUTO-ENTMCNC: 33.7 G/DL (ref 30–36.5)
MCV RBC AUTO: 86.1 FL (ref 80–99)
NRBC # BLD: 0 K/UL (ref 0–0.01)
NRBC BLD-RTO: 0 PER 100 WBC
PLATELET # BLD AUTO: 190 K/UL (ref 150–400)
PMV BLD AUTO: 11.2 FL (ref 8.9–12.9)
POTASSIUM SERPL-SCNC: 3.9 MMOL/L (ref 3.5–5.1)
RBC # BLD AUTO: 4.59 M/UL (ref 4.1–5.7)
SERVICE CMNT-IMP: NORMAL
SODIUM SERPL-SCNC: 144 MMOL/L (ref 136–145)
WBC # BLD AUTO: 8.2 K/UL (ref 4.1–11.1)

## 2024-10-24 PROCEDURE — 36415 COLL VENOUS BLD VENIPUNCTURE: CPT

## 2024-10-24 PROCEDURE — 99223 1ST HOSP IP/OBS HIGH 75: CPT | Performed by: INTERNAL MEDICINE

## 2024-10-24 PROCEDURE — 82962 GLUCOSE BLOOD TEST: CPT

## 2024-10-24 PROCEDURE — 6360000002 HC RX W HCPCS: Performed by: NURSE PRACTITIONER

## 2024-10-24 PROCEDURE — 2060000000 HC ICU INTERMEDIATE R&B

## 2024-10-24 PROCEDURE — 6370000000 HC RX 637 (ALT 250 FOR IP): Performed by: PSYCHIATRY & NEUROLOGY

## 2024-10-24 PROCEDURE — 97116 GAIT TRAINING THERAPY: CPT

## 2024-10-24 PROCEDURE — 99222 1ST HOSP IP/OBS MODERATE 55: CPT | Performed by: PHYSICAL MEDICINE & REHABILITATION

## 2024-10-24 PROCEDURE — 2580000003 HC RX 258: Performed by: NURSE PRACTITIONER

## 2024-10-24 PROCEDURE — 97530 THERAPEUTIC ACTIVITIES: CPT

## 2024-10-24 PROCEDURE — 80048 BASIC METABOLIC PNL TOTAL CA: CPT

## 2024-10-24 PROCEDURE — 99231 SBSQ HOSP IP/OBS SF/LOW 25: CPT | Performed by: NURSE PRACTITIONER

## 2024-10-24 PROCEDURE — 6370000000 HC RX 637 (ALT 250 FOR IP): Performed by: HOSPITALIST

## 2024-10-24 PROCEDURE — 97112 NEUROMUSCULAR REEDUCATION: CPT

## 2024-10-24 PROCEDURE — 85027 COMPLETE CBC AUTOMATED: CPT

## 2024-10-24 RX ADMIN — HYDRALAZINE HYDROCHLORIDE 10 MG: 20 INJECTION INTRAMUSCULAR; INTRAVENOUS at 23:12

## 2024-10-24 RX ADMIN — LOSARTAN POTASSIUM 25 MG: 50 TABLET, FILM COATED ORAL at 08:41

## 2024-10-24 RX ADMIN — LABETALOL HYDROCHLORIDE 10 MG: 5 INJECTION INTRAVENOUS at 22:16

## 2024-10-24 RX ADMIN — SODIUM CHLORIDE, PRESERVATIVE FREE 10 ML: 5 INJECTION INTRAVENOUS at 21:33

## 2024-10-24 RX ADMIN — AMLODIPINE BESYLATE 5 MG: 5 TABLET ORAL at 08:41

## 2024-10-24 RX ADMIN — ATORVASTATIN CALCIUM 40 MG: 40 TABLET, FILM COATED ORAL at 21:34

## 2024-10-24 RX ADMIN — SODIUM CHLORIDE, PRESERVATIVE FREE 10 ML: 5 INJECTION INTRAVENOUS at 08:43

## 2024-10-24 NOTE — CARE COORDINATION
Transition of Care Plan:    IPR -  [facility]; Pt denied 10/24, P2P scheduled for 3:30pm 10/25    Transport: BLS    RUR: 14%  Prior Level of Functioning: independent - Pt is   Disposition: IPR  If SNF or IPR: Date FOC offered: 10/23  Date FOC received: 10/23  Accepting facility: [facility]   Date authorization started with reference number: 10/23  Date authorization received and expires: Denied 10/24  Follow up appointments: PCP, Neuro   DME needed: none  Transportation at discharge: Medicaid stretcher  Caregiver Contact: Scout Alfaro (Brother/Sister)  [phone] (Mobile)   Discharge Caregiver contacted prior to discharge?   Care Conference needed? No  Barriers to discharge: Placement/auth -  P2P pending    Pt has been denied IPR by [employer], P2P offered and 7 days to complete. Dr. Barker informed - call [phone] option 2.    P2P scheduled 10/25 for 3:30pm.    KEE Corbin (Ally).

## 2024-10-24 NOTE — CONSULTS
[facility] CARDIOLOGY                    Cardiac EP Hospital Care Note     [x]Initial Encounter     []Follow-up    Patient Name: Jason Soliman - :1950 - MRN:[id number]  Primary Cardiologist: None  Consulting Cardiologist: Bi Jackson MD     Reason for encounter: stroke    HPI:       Jason Soliman is a 74 y.o. male with PMH significant for hypertension presented to hospital with acute L thalamic ICH, seizure like activities and hypertension  Dr Francis, neurologist recommended heart monitor before discharge as he also has multiple chronic infarcts and microhemorrhages with acute infarct in right corona radiata to basal ganglia and posterior internal capsule    EEG normal    He is waiting to go to [facility]      Assessment and Plan     Old and new stroke, some are likely related to hypertension but some could be embolic  Therefore I explained to him possibility of PAF and to monitor we can use external monitor 14 day short term or long term with ILR  He is going to [facility] and there is no clear plan for where he is going to be after [facility]  He said he does not want to bother his brother and does not like to go back to homeless shelter  If he has ILR, it will be difficult to check remotely  The short term external monitor he can ask [facility] staff to mail back   He agrees with this    Hypertension: stable on below medications         ____________________________________________________________    Cardiac testing  10/21/24    ECHO (TTE) COMPLETE (PRN CONTRAST/BUBBLE/STRAIN/3D) 10/22/2024 12:54 PM (Final)    Interpretation Summary    Left Ventricle: Normal left ventricular systolic function with a visually estimated EF of 55 - 60%. Left ventricle size is normal. Normal wall thickness. Normal wall motion. Abnormal diastolic function.    Right Ventricle: Normal systolic function. TAPSE is normal. TAPSE is 2.6 cm.    Mitral Valve: Mild regurgitation.    Tricuspid Valve: Mild regurgitation.    Interatrial 
Neurology Staff Addendum:  I have personally seen and examined the patient. I have personally reviewed the chart and images. Elements of my examination included history of present illness, review of systems, review of past medical and surgical history, review of medications, and physical and neurological examination. I have personally reviewed the findings and impressions with the nurse practitioner and am in agreement with their note with changes below.    Pt is a 75yo RH male with untreated HTN, prediabetes, smoking 3-4 cigarettes/day, no alcohol or drug use, admitted 10/21/24 after seizure-like activity seen at the homeless shelter, described as being stiff and altered intermittently, no post-ictal period after, able to talk to staff.  His blood pressure was checked at the shelter and they recommended he present for elevated BP.  Pt reported random falls since 10/20/24.  /114. CTH with small left thalamic hemorrhage, stable on repeat CTH this morning.  EKG sinus tachycardia.  .4.  HgbA1c 5.7.  TTE with EF 55-60%, no PFO on bubble study. UA and UDS neg.  Creatinine 1.32.  Patient has been started on empiric Keppra 500 mg twice daily. Pt recalls falling, no reason, could not get back up due to weakness. Noticed prior to the fall that he was moving slowly.  Denies hitting his head.  He denies h/o CVA. +Family h/o CVA in Mom.  He is from [place], but has been in the [place] since [place].  Quit drinking alcohol a long time ago and quit smoking marijuana last year. Pt feels like his left side is weak and he had slurred speech.     /81   Pulse 84   Temp 97.7 °F (36.5 °C) (Oral)   Resp 13   Ht 1.829 m (6')   Wt 59.3 kg (130 lb 11.7 oz)   SpO2 97%   BMI 17.73 kg/m²     Physical Exam:  General: Well developed well nourished patient in no apparent distress.   Cardiac: Regular rate and rhythm with no murmurs.   Carotids: 2+ symmetric, no bruits  Extremities: 2+ Radial pulses, no cyanosis or 
Neurosurgery    Small thalamic hemorrhagic stroke.    Repeat head CT in am    Non-surgical treatment    If stable CT, no further treatment needed    
           Last Modified POA    * (Principal) Thalamic hemorrhage (HCC) 10/21/2024 Yes    Nontraumatic intracerebral hemorrhage (HCC) 10/22/2024 Yes    Cerebrovascular accident (CVA) (HCC) 10/23/2024 Yes    Hypertension 10/23/2024 Yes    Hyperlipidemia 10/22/2024 Yes       Plan   74-year-old male who was previously independent who suffered an acute right corona radiata ischemic stroke and a left thalamic intracerebral hemorrhage now with left-sided neglect, dysarthria, left-sided weakness with foot drop placing him at risk for loss of balance and subsequent fall as well as difficulties with his ADLs who is below his functional baseline for him I do recommend acute inpatient rehabilitation where he will receive 3 hours of therapy, 5 days a week with 24/7 nursing care and physician oversight for least 3 days/week.  Physician to oversee and manage titration of antihypertensives in the setting of intracerebral hemorrhage with the etiology likely related to hypertension, monitor for signs and symptoms of neurologic change following the resumption of aspirin as a source of risk factor modification for his stroke, monitor for signs and symptoms of spasticity, provide education on secondary stroke prevention to prevent future stroke and lead an interdisciplinary team to help the patient return to his highest level of function patient is expected to make reasonable gains.    I did spend time providing education on the patient's functional deficits as well as causes of his stroke.  I also provided education on what to expect during inpatient rehabilitation and prognosis regarding his recovery.  I provided case management and the therapy team with my recommendations for acute inpatient rehabilitation to help him regain his independence.    Thank you for this consultation  Please feel free to contact me directly with any questions or concerns.  Thelma Barker, DO  [phone]      I have spent a total of 60 minutes reviewing

## 2024-10-25 ENCOUNTER — HOSPITAL ENCOUNTER (INPATIENT)
Facility: HOSPITAL | Age: 74
Discharge: HOME | DRG: 044 | End: 2024-10-27
Attending: INTERNAL MEDICINE
Payer: COMMERCIAL

## 2024-10-25 LAB — ECHO BSA: 1.83 M2

## 2024-10-25 PROCEDURE — 2580000003 HC RX 258: Performed by: NURSE PRACTITIONER

## 2024-10-25 PROCEDURE — 93246 EXT ECG>7D<15D RECORDING: CPT

## 2024-10-25 PROCEDURE — 6370000000 HC RX 637 (ALT 250 FOR IP): Performed by: PSYCHIATRY & NEUROLOGY

## 2024-10-25 PROCEDURE — 6360000002 HC RX W HCPCS: Performed by: NURSE PRACTITIONER

## 2024-10-25 PROCEDURE — 2060000000 HC ICU INTERMEDIATE R&B

## 2024-10-25 PROCEDURE — 6370000000 HC RX 637 (ALT 250 FOR IP)

## 2024-10-25 PROCEDURE — 6370000000 HC RX 637 (ALT 250 FOR IP): Performed by: HOSPITALIST

## 2024-10-25 PROCEDURE — 97116 GAIT TRAINING THERAPY: CPT

## 2024-10-25 RX ORDER — HYDROXYZINE HYDROCHLORIDE 10 MG/1
10 TABLET, FILM COATED ORAL
Status: COMPLETED | OUTPATIENT
Start: 2024-10-25 | End: 2024-10-25

## 2024-10-25 RX ADMIN — SODIUM CHLORIDE, PRESERVATIVE FREE 10 ML: 5 INJECTION INTRAVENOUS at 09:06

## 2024-10-25 RX ADMIN — LOSARTAN POTASSIUM 25 MG: 50 TABLET, FILM COATED ORAL at 09:06

## 2024-10-25 RX ADMIN — ATORVASTATIN CALCIUM 40 MG: 40 TABLET, FILM COATED ORAL at 21:44

## 2024-10-25 RX ADMIN — AMLODIPINE BESYLATE 5 MG: 5 TABLET ORAL at 09:06

## 2024-10-25 RX ADMIN — HYDRALAZINE HYDROCHLORIDE 10 MG: 20 INJECTION INTRAMUSCULAR; INTRAVENOUS at 05:48

## 2024-10-25 RX ADMIN — HYDROXYZINE HYDROCHLORIDE 10 MG: 10 TABLET ORAL at 00:57

## 2024-10-25 RX ADMIN — SODIUM CHLORIDE, PRESERVATIVE FREE 10 ML: 5 INJECTION INTRAVENOUS at 21:44

## 2024-10-25 NOTE — CARE COORDINATION
Transition of Care Plan:    IPR -  [facility]; P2P completed/denial overturned   - Pt approved for IPR 7 days - JANNET 10/26    [facility] weekend liaison:   Saturday: Tere Shelley [phone]  Sunday: Shannan Phipps [phone]    Transport: BLS    RUR: 13%  Prior Level of Functioning: independent - Pt is   Disposition: IPR  If SNF or IPR: Date FOC offered: 10/23  Date FOC received: 10/23  Accepting facility: [facility]   Date authorization started with reference number: 10/23  Date authorization received and expires: Denied 10/24; P2P done 10/25 and overturned 10/25  Follow up appointments: PCP, Neuro   DME needed: none  Transportation at discharge: Medicaid stretcher  Caregiver Contact: Scout Alfaro (Brother/Sister)  [phone] (Mobile)   Discharge Caregiver contacted prior to discharge?   Care Conference needed? No  Barriers to discharge: bed availability     P2P completed and denial overturned. Pt approved for IPR at [facility] for 7 days. No bed available today but likely tomorrow. CM to reach out to [facility] weekend liaison to check bed availability.     KEE Corbin (Ally).

## 2024-10-26 PROCEDURE — 2060000000 HC ICU INTERMEDIATE R&B

## 2024-10-26 PROCEDURE — 6370000000 HC RX 637 (ALT 250 FOR IP): Performed by: HOSPITALIST

## 2024-10-26 PROCEDURE — 6360000002 HC RX W HCPCS: Performed by: NURSE PRACTITIONER

## 2024-10-26 PROCEDURE — 2580000003 HC RX 258: Performed by: NURSE PRACTITIONER

## 2024-10-26 PROCEDURE — 6370000000 HC RX 637 (ALT 250 FOR IP): Performed by: PSYCHIATRY & NEUROLOGY

## 2024-10-26 RX ADMIN — HYDRALAZINE HYDROCHLORIDE 10 MG: 20 INJECTION INTRAMUSCULAR; INTRAVENOUS at 10:24

## 2024-10-26 RX ADMIN — ATORVASTATIN CALCIUM 40 MG: 40 TABLET, FILM COATED ORAL at 21:07

## 2024-10-26 RX ADMIN — AMLODIPINE BESYLATE 5 MG: 5 TABLET ORAL at 08:32

## 2024-10-26 RX ADMIN — SODIUM CHLORIDE, PRESERVATIVE FREE 10 ML: 5 INJECTION INTRAVENOUS at 21:08

## 2024-10-26 RX ADMIN — LOSARTAN POTASSIUM 25 MG: 50 TABLET, FILM COATED ORAL at 08:32

## 2024-10-26 NOTE — CARE COORDINATION
Transition of Care Plan  RUR 13%    IPR -  [facility]; P2P completed/denial overturned   - Pt approved for IPR 7 days - JANNET 10/27  Per liaison Tere Shelley--No bed 10/26/24  CM will check tomorrow  Dr Koch  informed    Patient will require BLS transport     [facility] weekend liaison:   Saturday: Tere Shelley [phone]  Sunday: Shannan Phipps [phone]

## 2024-10-27 PROCEDURE — 2580000003 HC RX 258: Performed by: NURSE PRACTITIONER

## 2024-10-27 PROCEDURE — 6370000000 HC RX 637 (ALT 250 FOR IP): Performed by: HOSPITALIST

## 2024-10-27 PROCEDURE — 6370000000 HC RX 637 (ALT 250 FOR IP): Performed by: PSYCHIATRY & NEUROLOGY

## 2024-10-27 PROCEDURE — 6360000002 HC RX W HCPCS: Performed by: NURSE PRACTITIONER

## 2024-10-27 PROCEDURE — 94760 N-INVAS EAR/PLS OXIMETRY 1: CPT

## 2024-10-27 PROCEDURE — 2060000000 HC ICU INTERMEDIATE R&B

## 2024-10-27 RX ADMIN — SODIUM CHLORIDE, PRESERVATIVE FREE 10 ML: 5 INJECTION INTRAVENOUS at 20:21

## 2024-10-27 RX ADMIN — LABETALOL HYDROCHLORIDE 10 MG: 5 INJECTION INTRAVENOUS at 22:01

## 2024-10-27 RX ADMIN — AMLODIPINE BESYLATE 5 MG: 5 TABLET ORAL at 08:23

## 2024-10-27 RX ADMIN — LOSARTAN POTASSIUM 25 MG: 50 TABLET, FILM COATED ORAL at 08:23

## 2024-10-27 RX ADMIN — ATORVASTATIN CALCIUM 40 MG: 40 TABLET, FILM COATED ORAL at 20:21

## 2024-10-28 VITALS
HEIGHT: 72 IN | BODY MASS INDEX: 14.3 KG/M2 | OXYGEN SATURATION: 98 % | RESPIRATION RATE: 17 BRPM | TEMPERATURE: 98.4 F | WEIGHT: 105.6 LBS | SYSTOLIC BLOOD PRESSURE: 136 MMHG | HEART RATE: 81 BPM | DIASTOLIC BLOOD PRESSURE: 89 MMHG

## 2024-10-28 LAB
ANION GAP SERPL CALC-SCNC: 9 MMOL/L (ref 2–12)
BASOPHILS # BLD: 0 K/UL (ref 0–0.1)
BASOPHILS NFR BLD: 0 % (ref 0–1)
BUN SERPL-MCNC: 31 MG/DL (ref 6–20)
BUN/CREAT SERPL: 23 (ref 12–20)
CALCIUM SERPL-MCNC: 9.4 MG/DL (ref 8.5–10.1)
CHLORIDE SERPL-SCNC: 112 MMOL/L (ref 97–108)
CO2 SERPL-SCNC: 21 MMOL/L (ref 21–32)
CREAT SERPL-MCNC: 1.36 MG/DL (ref 0.7–1.3)
DIFFERENTIAL METHOD BLD: ABNORMAL
EOSINOPHIL # BLD: 0 K/UL (ref 0–0.4)
EOSINOPHIL NFR BLD: 0 % (ref 0–7)
ERYTHROCYTE [DISTWIDTH] IN BLOOD BY AUTOMATED COUNT: 14.9 % (ref 11.5–14.5)
GLUCOSE SERPL-MCNC: 100 MG/DL (ref 65–100)
HCT VFR BLD AUTO: 43.5 % (ref 36.6–50.3)
HGB BLD-MCNC: 14.6 G/DL (ref 12.1–17)
IMM GRANULOCYTES # BLD AUTO: 0 K/UL (ref 0–0.04)
IMM GRANULOCYTES NFR BLD AUTO: 0 % (ref 0–0.5)
LYMPHOCYTES # BLD: 1.7 K/UL (ref 0.8–3.5)
LYMPHOCYTES NFR BLD: 13 % (ref 12–49)
MCH RBC QN AUTO: 28.7 PG (ref 26–34)
MCHC RBC AUTO-ENTMCNC: 33.6 G/DL (ref 30–36.5)
MCV RBC AUTO: 85.5 FL (ref 80–99)
MONOCYTES # BLD: 0.9 K/UL (ref 0–1)
MONOCYTES NFR BLD: 7 % (ref 5–13)
NEUTS SEG # BLD: 10.2 K/UL (ref 1.8–8)
NEUTS SEG NFR BLD: 80 % (ref 32–75)
NRBC # BLD: 0 K/UL (ref 0–0.01)
NRBC BLD-RTO: 0 PER 100 WBC
PLATELET # BLD AUTO: 202 K/UL (ref 150–400)
PMV BLD AUTO: 9.9 FL (ref 8.9–12.9)
POTASSIUM SERPL-SCNC: 3.9 MMOL/L (ref 3.5–5.1)
RBC # BLD AUTO: 5.09 M/UL (ref 4.1–5.7)
SODIUM SERPL-SCNC: 142 MMOL/L (ref 136–145)
WBC # BLD AUTO: 12.9 K/UL (ref 4.1–11.1)

## 2024-10-28 PROCEDURE — 2580000003 HC RX 258: Performed by: NURSE PRACTITIONER

## 2024-10-28 PROCEDURE — 99231 SBSQ HOSP IP/OBS SF/LOW 25: CPT | Performed by: NURSE PRACTITIONER

## 2024-10-28 PROCEDURE — 85025 COMPLETE CBC W/AUTO DIFF WBC: CPT

## 2024-10-28 PROCEDURE — 80048 BASIC METABOLIC PNL TOTAL CA: CPT

## 2024-10-28 PROCEDURE — 6360000002 HC RX W HCPCS: Performed by: NURSE PRACTITIONER

## 2024-10-28 PROCEDURE — 97116 GAIT TRAINING THERAPY: CPT

## 2024-10-28 PROCEDURE — 97535 SELF CARE MNGMENT TRAINING: CPT

## 2024-10-28 PROCEDURE — 6370000000 HC RX 637 (ALT 250 FOR IP): Performed by: HOSPITALIST

## 2024-10-28 RX ORDER — LOSARTAN POTASSIUM 25 MG/1
25 TABLET ORAL DAILY
Qty: 30 TABLET | Refills: 3 | Status: SHIPPED | OUTPATIENT
Start: 2024-10-29

## 2024-10-28 RX ORDER — ATORVASTATIN CALCIUM 40 MG/1
40 TABLET, FILM COATED ORAL NIGHTLY
Qty: 30 TABLET | Refills: 3 | Status: SHIPPED | OUTPATIENT
Start: 2024-10-28

## 2024-10-28 RX ORDER — AMLODIPINE BESYLATE 5 MG/1
5 TABLET ORAL DAILY
Qty: 30 TABLET | Refills: 3 | Status: SHIPPED | OUTPATIENT
Start: 2024-10-29

## 2024-10-28 RX ORDER — ASPIRIN 81 MG/1
81 TABLET ORAL DAILY
Qty: 90 TABLET | Refills: 0 | Status: SHIPPED | OUTPATIENT
Start: 2024-10-28

## 2024-10-28 RX ORDER — CLONAZEPAM 0.5 MG/1
0.5 TABLET ORAL 2 TIMES DAILY
Qty: 20 TABLET | Refills: 0 | Status: SHIPPED | OUTPATIENT
Start: 2024-10-28 | End: 2024-11-07

## 2024-10-28 RX ADMIN — LABETALOL HYDROCHLORIDE 10 MG: 5 INJECTION INTRAVENOUS at 13:21

## 2024-10-28 RX ADMIN — AMLODIPINE BESYLATE 5 MG: 5 TABLET ORAL at 08:56

## 2024-10-28 RX ADMIN — SODIUM CHLORIDE, PRESERVATIVE FREE 10 ML: 5 INJECTION INTRAVENOUS at 08:56

## 2024-10-28 RX ADMIN — LOSARTAN POTASSIUM 25 MG: 50 TABLET, FILM COATED ORAL at 08:54

## 2024-10-28 RX ADMIN — HYDRALAZINE HYDROCHLORIDE 10 MG: 20 INJECTION INTRAMUSCULAR; INTRAVENOUS at 06:14

## 2024-10-28 NOTE — CARE COORDINATION
Transition of Care Plan:    IPR -  [facility]; DC today  RN to call report to [phone]    Transport: [facility] 5pm  (Medicaid trip auth ID#[id number])    RUR: 13%  Prior Level of Functioning: independent   Disposition: IPR  If SNF or IPR: Date FOC offered: 10/23  Date FOC received: 10/23  Accepting facility: [facility]   Date authorization started with reference number: 10/23  Date authorization received and expires: Denied 10/24; P2P done 10/25 and overturned 10/25  Follow up appointments: PCP, Neuro   DME needed: none  Transportation at discharge: Medicaid stretcher  Caregiver Contact: Scout Alfaro (Brother/Sister)  [phone] (Mobile)   Discharge Caregiver contacted prior to discharge? Yes  Care Conference needed? No  Barriers to discharge: None    [facility] has confirmed bed is available for Pt this afternoon.      spoke with both Pt and Pt's brother Scout and they are both in agreement with discharge plan today. Scout requested CM email DC summary to [facility] so that they may hold Pt's bed following rehab.     [phone]. Medicaid trip ID#[id number] provided to [facility] for 3pm  transport.    2:45pm: Per Hospitalist request, transport pushed to 5pm so Neuro can see Pt.    3:45pm: Pt now clear per neuro, Medicaid provided stretcher company arrived on scene to take Pt. [facility] cancelled.    KEE Corbin (Ally).

## 2024-10-28 NOTE — PLAN OF CARE
Problem: Chronic Conditions and Co-morbidities  Goal: Patient's chronic conditions and co-morbidity symptoms are monitored and maintained or improved  10/27/2024 2338 by Romana Chandler RN  Outcome: Progressing     Problem: Skin/Tissue Integrity  Goal: Absence of new skin breakdown  Description: 1.  Monitor for areas of redness and/or skin breakdown  2.  Assess vascular access sites hourly  3.  Every 4-6 hours minimum:  Change oxygen saturation probe site  4.  Every 4-6 hours:  If on nasal continuous positive airway pressure, respiratory therapy assess nares and determine need for appliance change or resting period.  10/27/2024 2338 by Romana Chandler RN  Outcome: Progressing     Problem: Discharge Planning  Goal: Discharge to home or other facility with appropriate resources  10/28/2024 1057 by Karlee Perez RN  Outcome: Completed  10/27/2024 2338 by Romana Chandler RN  Outcome: Progressing     Problem: Pain  Goal: Verbalizes/displays adequate comfort level or baseline comfort level  10/28/2024 1057 by Karlee Perez RN  Outcome: Completed  10/27/2024 2338 by Romana Chandler RN  Outcome: Progressing     Problem: Safety - Adult  Goal: Free from fall injury  10/28/2024 1057 by Karlee Perez RN  Outcome: Completed  10/27/2024 2338 by Romana Chandler RN  Outcome: Progressing     
  Problem: Discharge Planning  Goal: Discharge to home or other facility with appropriate resources  10/26/2024 1119 by Amanda Koch RN  Outcome: Progressing  10/26/2024 0533 by Selma Casper RN  Outcome: Progressing     Problem: Pain  Goal: Verbalizes/displays adequate comfort level or baseline comfort level  10/26/2024 1119 by Amanda Koch RN  Outcome: Progressing  10/26/2024 0533 by Selma Casper RN  Outcome: Progressing     Problem: Safety - Adult  Goal: Free from fall injury  10/26/2024 1119 by Amanda Koch RN  Outcome: Progressing  10/26/2024 0533 by Selma Casper RN  Outcome: Progressing     Problem: Chronic Conditions and Co-morbidities  Goal: Patient's chronic conditions and co-morbidity symptoms are monitored and maintained or improved  10/26/2024 1119 by Amanda Koch RN  Outcome: Progressing  10/26/2024 0533 by Selma Casper RN  Outcome: Progressing     Problem: Skin/Tissue Integrity  Goal: Absence of new skin breakdown  Description: 1.  Monitor for areas of redness and/or skin breakdown  2.  Assess vascular access sites hourly  3.  Every 4-6 hours minimum:  Change oxygen saturation probe site  4.  Every 4-6 hours:  If on nasal continuous positive airway pressure, respiratory therapy assess nares and determine need for appliance change or resting period.  Outcome: Progressing     
  Problem: Discharge Planning  Goal: Discharge to home or other facility with appropriate resources  10/27/2024 0006 by Romana Chandler RN  Outcome: Progressing  10/26/2024 1119 by Amanda Koch RN  Outcome: Progressing     Problem: Pain  Goal: Verbalizes/displays adequate comfort level or baseline comfort level  10/27/2024 0006 by Romana Chandler RN  Outcome: Progressing  10/26/2024 1119 by Amanda Koch RN  Outcome: Progressing     Problem: Safety - Adult  Goal: Free from fall injury  10/27/2024 0006 by Romana Chandler RN  Outcome: Progressing  10/26/2024 1119 by Amanda Koch RN  Outcome: Progressing     Problem: Chronic Conditions and Co-morbidities  Goal: Patient's chronic conditions and co-morbidity symptoms are monitored and maintained or improved  10/27/2024 0006 by Romana Chandler RN  Outcome: Progressing  10/26/2024 1119 by Amanda Koch RN  Outcome: Progressing     Problem: Skin/Tissue Integrity  Goal: Absence of new skin breakdown  Description: 1.  Monitor for areas of redness and/or skin breakdown  2.  Assess vascular access sites hourly  3.  Every 4-6 hours minimum:  Change oxygen saturation probe site  4.  Every 4-6 hours:  If on nasal continuous positive airway pressure, respiratory therapy assess nares and determine need for appliance change or resting period.  10/27/2024 0006 by Romana Chandler RN  Outcome: Progressing  10/26/2024 1119 by Amanda Koch RN  Outcome: Progressing     
  Problem: Discharge Planning  Goal: Discharge to home or other facility with appropriate resources  Outcome: Progressing     Problem: Pain  Goal: Verbalizes/displays adequate comfort level or baseline comfort level  Outcome: Progressing     Problem: Safety - Adult  Goal: Free from fall injury  Outcome: Progressing     Problem: Chronic Conditions and Co-morbidities  Goal: Patient's chronic conditions and co-morbidity symptoms are monitored and maintained or improved  Outcome: Progressing     Problem: Skin/Tissue Integrity  Goal: Absence of new skin breakdown  Description: 1.  Monitor for areas of redness and/or skin breakdown  2.  Assess vascular access sites hourly  3.  Every 4-6 hours minimum:  Change oxygen saturation probe site  4.  Every 4-6 hours:  If on nasal continuous positive airway pressure, respiratory therapy assess nares and determine need for appliance change or resting period.  Outcome: Progressing     
  Problem: Discharge Planning  Goal: Discharge to home or other facility with appropriate resources  Outcome: Progressing  Flowsheets (Taken 10/22/2024 1600 by Lorraine Lisa, RN)  Discharge to home or other facility with appropriate resources:   Identify barriers to discharge with patient and caregiver   Arrange for needed discharge resources and transportation as appropriate   Identify discharge learning needs (meds, wound care, etc)   Arrange for interpreters to assist at discharge as needed   Refer to discharge planning if patient needs post-hospital services based on physician order or complex needs related to functional status, cognitive ability or social support system     Problem: Discharge Planning  Goal: Discharge to home or other facility with appropriate resources  Outcome: Progressing  Flowsheets (Taken 10/22/2024 1600 by Lorraine Lisa, RN)  Discharge to home or other facility with appropriate resources:   Identify barriers to discharge with patient and caregiver   Arrange for needed discharge resources and transportation as appropriate   Identify discharge learning needs (meds, wound care, etc)   Arrange for interpreters to assist at discharge as needed   Refer to discharge planning if patient needs post-hospital services based on physician order or complex needs related to functional status, cognitive ability or social support system     Problem: Pain  Goal: Verbalizes/displays adequate comfort level or baseline comfort level  Outcome: Progressing     Problem: Safety - Adult  Goal: Free from fall injury  Outcome: Progressing  Flowsheets (Taken 10/22/2024 1600 by Lorraine Lisa, RN)  Free From Fall Injury:   Instruct family/caregiver on patient safety   Based on caregiver fall risk screen, instruct family/caregiver to ask for assistance with transferring infant if caregiver noted to have fall risk factors     Problem: Chronic Conditions and Co-morbidities  Goal: Patient's chronic conditions and 
  Problem: Discharge Planning  Goal: Discharge to home or other facility with appropriate resources  Outcome: Progressing  Flowsheets (Taken 10/23/2024 0800)  Discharge to home or other facility with appropriate resources:   Identify barriers to discharge with patient and caregiver   Arrange for needed discharge resources and transportation as appropriate   Identify discharge learning needs (meds, wound care, etc)     Problem: Pain  Goal: Verbalizes/displays adequate comfort level or baseline comfort level  Outcome: Progressing  Flowsheets (Taken 10/23/2024 0800)  Verbalizes/displays adequate comfort level or baseline comfort level:   Encourage patient to monitor pain and request assistance   Assess pain using appropriate pain scale   Administer analgesics based on type and severity of pain and evaluate response     Problem: Safety - Adult  Goal: Free from fall injury  Outcome: Progressing  Flowsheets (Taken 10/23/2024 0800)  Free From Fall Injury: Instruct family/caregiver on patient safety     Problem: Physical Therapy - Adult  Goal: By Discharge: Performs mobility at highest level of function for planned discharge setting.  See evaluation for individualized goals.  Description: FUNCTIONAL STATUS PRIOR TO ADMISSION: Patient was independent and active without use of DME.    HOME SUPPORT PRIOR TO ADMISSION:  Patient is homeless but has resided at [facility] for approximately 4 months, shelter provides meals and facilities for ADL engagement.     Physical Therapy Goals  Initiated 10/22/2024  1.  Patient will move from supine to sit and sit to supine in bed with independence within 7 day(s).    2.  Patient will perform sit to stand with independence within 7 day(s).  3.  Patient will transfer from bed to chair and chair to bed with independence using the least restrictive device within 7 day(s).  4.  Patient will ambulate with modified independence for 150 feet with the least restrictive device within 7 
  Problem: Occupational Therapy - Adult  Goal: By Discharge: Performs self-care activities at highest level of function for planned discharge setting.  See evaluation for individualized goals.  Description: FUNCTIONAL STATUS PRIOR TO ADMISSION: Patient reports independence with ADLs and mobility without device.      HOME SUPPORT: Patient is homeless but has resided at [facility] for approximately 4 months, shelter provides meals and facilities for ADL engagement.     Occupational Therapy Goals  Initiated 10/22/2024   1.  Patient will perform grooming in standing for 3 minutes with Contact Guard Assist within 7 day(s).  2.  Patient will perform upper body dressing with Contact Guard Assist within 7 day(s).  3.  Patient will perform lower body dressing with Minimal Assist within 7 day(s).  4.  Patient will perform toilet transfers with Contact Guard Assist within 7 day(s).  5.  Patient will perform all aspects of toileting with Minimal Assist within 7 day(s).  6.  Patient will participate in upper extremity therapeutic exercise/activities with Contact Guard Assist within 7 day(s).    7.  Patient will utilize energy conservation techniques during functional activities with verbal cues within 7 day(s).  8.  Patient will improve their Fugl Almaraz score by 5 points in prep for ADLs within 7 days.    Outcome: Progressing   OCCUPATIONAL THERAPY TREATMENT  Patient: Jason Soliman (74 y.o. 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    ASSESSMENT  Patient continues to benefit from skilled OT services and is progressing towards goals. Patient continues to exhibit R-gaze preference and 
  Problem: Occupational Therapy - Adult  Goal: By Discharge: Performs self-care activities at highest level of function for planned discharge setting.  See evaluation for individualized goals.  Description: FUNCTIONAL STATUS PRIOR TO ADMISSION: Patient reports independence with ADLs and mobility without device.      HOME SUPPORT: Patient is homeless but has resided at [facility] for approximately 4 months, shelter provides meals and facilities for ADL engagement.     Occupational Therapy Goals  Initiated 10/22/2024   1.  Patient will perform grooming in standing for 3 minutes with Contact Guard Assist within 7 day(s).  2.  Patient will perform upper body dressing with Contact Guard Assist within 7 day(s).  3.  Patient will perform lower body dressing with Minimal Assist within 7 day(s).  4.  Patient will perform toilet transfers with Contact Guard Assist within 7 day(s).  5.  Patient will perform all aspects of toileting with Minimal Assist within 7 day(s).  6.  Patient will participate in upper extremity therapeutic exercise/activities with Contact Guard Assist within 7 day(s).    7.  Patient will utilize energy conservation techniques during functional activities with verbal cues within 7 day(s).  8.  Patient will improve their Fugl Almaraz score by 5 points in prep for ADLs within 7 days.    Outcome: Progressing     OCCUPATIONAL THERAPY EVALUATION    Patient: Jason Soliman (74 y.o. 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 
  Problem: Occupational Therapy - Adult  Goal: By Discharge: Performs self-care activities at highest level of function for planned discharge setting.  See evaluation for individualized goals.  Description: FUNCTIONAL STATUS PRIOR TO ADMISSION: Patient reports independence with ADLs and mobility without device.      HOME SUPPORT: Patient is homeless but has resided at [facility] for approximately 4 months, shelter provides meals and facilities for ADL engagement.     Occupational Therapy Goals  Initiated 10/22/2024 ; continue goals 10/28/24  1.  Patient will perform grooming in standing for 3 minutes with Contact Guard Assist within 7 day(s).  2.  Patient will perform upper body dressing with Contact Guard Assist within 7 day(s).  3.  Patient will perform lower body dressing with Minimal Assist within 7 day(s).  4.  Patient will perform toilet transfers with Contact Guard Assist within 7 day(s).  5.  Patient will perform all aspects of toileting with Minimal Assist within 7 day(s).  6.  Patient will participate in upper extremity therapeutic exercise/activities with Contact Guard Assist within 7 day(s).    7.  Patient will utilize energy conservation techniques during functional activities with verbal cues within 7 day(s).  8.  Patient will improve their Fugl Almaraz score by 5 points in prep for ADLs within 7 days.    Outcome: Progressing   OCCUPATIONAL THERAPY TREATMENT: WEEKLY REASSESSMENT    Patient: Jason Soliman (74 y.o. male)  Date: 10/28/2024  Primary Diagnosis: Thalamic hemorrhage (HCC) [I61.0]  Cerebrovascular accident (CVA), unspecified mechanism (HCC) [I63.9]  Nontraumatic intracerebral hemorrhage, unspecified cerebral location, unspecified laterality (HCC) [I61.9]       Precautions: Fall Risk, Seizure                Chart, occupational therapy assessment, plan of care, and goals were reviewed.    ASSESSMENT  Patient continues to benefit from skilled OT services and is progressing towards goals. 
  Problem: Physical Therapy - Adult  Goal: By Discharge: Performs mobility at highest level of function for planned discharge setting.  See evaluation for individualized goals.  Description: FUNCTIONAL STATUS PRIOR TO ADMISSION: Patient was independent and active without use of DME.    HOME SUPPORT PRIOR TO ADMISSION:  Patient is homeless but has resided at the [facility] for approximately 4 months, shelter provides meals and facilities for ADL engagement.     Physical Therapy Goals  Initiated 10/22/2024  1.  Patient will move from supine to sit and sit to supine in bed with independence within 7 day(s).    2.  Patient will perform sit to stand with independence within 7 day(s).  3.  Patient will transfer from bed to chair and chair to bed with independence using the least restrictive device within 7 day(s).  4.  Patient will ambulate with modified independence for 150 feet with the least restrictive device within 7 day(s).   5.  Patient will improve Cook Balance score by 7 points within 7 days.   Outcome: Progressing     PHYSICAL THERAPY TREATMENT    Patient: Jason Soliman (74 y.o. male)  Date: 10/23/2024  Diagnosis: Thalamic hemorrhage (HCC) [I61.0]  Cerebrovascular accident (CVA), unspecified mechanism (HCC) [I63.9]  Nontraumatic intracerebral hemorrhage, unspecified cerebral location, unspecified laterality (HCC) [I61.9] Thalamic hemorrhage (HCC)      Precautions: Fall Risk, Seizure                      ASSESSMENT:  Patient continues to benefit from skilled PT services and is progressing towards goals.   Pt continues to have some L neglect, requiring Vc'ing for attention to L side.   Pt demos improvement In ambulation distance / standing activity tolerance, but noted to have poor scanning of environment. Requires CGA - Hannah for safety. Will continue to follow.     PLAN:  Patient continues to benefit from skilled intervention to address the above impairments.  Continue treatment per established plan of 
  Problem: Safety - Adult  Goal: Free from fall injury  Outcome: Progressing     Problem: Physical Therapy - Adult  Goal: By Discharge: Performs mobility at highest level of function for planned discharge setting.  See evaluation for individualized goals.  10/25/2024 1247 by Keyla Corey, PTA  Outcome: Progressing     
Speech LAnguage Pathology EVALUATION    Patient: Jason Soliman (74 y.o. 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 Patient with subjective complaints of slowed rate of speech as compared to his baseline. Also noted occasional articulatory imprecision in conversation. Patient with overall functional speech intelligibility despite these changes. Swallow assessed with breakfast tray of thin liquids and solids. Reduced oral bolus containment on R and slowed but efficient mastication of solid seen. No overt s/s of aspiration or pharyngeal deficits appreciated at bedside. Continue to recommend regular texture diet with thin liquids with aspiration precautions as listed below. SLP will follow for swallow and speech in current setting.    Patient will benefit from skilled intervention to address the above impairments.     PLAN :  Recommendations and Planned Interventions:  Diet: Regular and thin liquids  -Oral medications whole with liquids  -Upright for PO intake, small bites, slow rate, alternate bites/sips  -Oral care 2-3 times daily    Speech: Slow rate, over-articulate    Recommend next SLP session: Swallow re-assessment, Dysarthria education    Acute SLP Services: SLP Plan of Care: 3 times/week. Patient's rehabilitation potential is considered to be Excellent.  Discharge Recommendations: Continue to assess pending progress     SUBJECTIVE:   Patient stated, “You made me smile.”    OBJECTIVE:   No past medical history on file.No past surgical history on file.    Prior Level of Function/Home Situation:   Baseline Diet: Regular texture with thin liquids    Baseline 
Speech LAnguage Pathology TREATMENT/DISCHARGE    Patient: Jason Soliman (74 y.o. male)  Date: 10/23/2024  Primary Diagnosis: Thalamic hemorrhage (HCC) [I61.0]  Cerebrovascular accident (CVA), unspecified mechanism (HCC) [I63.9]  Nontraumatic intracerebral hemorrhage, unspecified cerebral location, unspecified laterality (HCC) [I61.9]    Precautions: Fall Risk, Seizure                  ASSESSMENT:  Patient presents with improvements in both oropharyngeal swallow and speech function. Recommend he continue current diet with general swallow precautions. Reviewed compensatory speech strategies to be used as needed.     Patient will be discharged from skilled speech-language pathology services at this time.     PLAN :  Recommendations and Planned Interventions:  Diet: Regular and thin liquids  -Oral medications whole with liquids  -Upright for PO intake, small bites, slow rate, alternate bites/sips  -Oral care 2-3 times daily   Speech: Slow rate, over-articulate    Acute SLP Services: No, patient will be discharged from acute skilled speech-language pathology at this time.  Discharge Recommendations: Continue to assess pending progress     SUBJECTIVE:   Patient stated, “I'd like some apple sauce.”    OBJECTIVE:   No past medical history on file.No past surgical history on file.    Cognitive and Communication Status:  Neurologic State: Alert  Orientation Level: Oriented x4  Cognition: Appropriate for age attention/concentration and Follows commands    P.O. Trials:  Assessment Method(s): Observation  Patient Position: Partially upright in bed  Vocal Quality: No Impairment  Consistency Presented: Thin;Regular   How Presented: Self-fed/presented;Straw;Successive Swallows  Bolus Acceptance: No impairment  Bolus Formation/Control: Functional  Propulsion: Functional  Oral Residue: Cleared with liquid wash  Initiation of Swallow: Present  Laryngeal Elevation: Present  Aspiration Signs/Symptoms: None  Pharyngeal Phase 
Conditions and Co-Morbidity Symptoms are Monitored and Maintained or Improved: Monitor and assess patient's chronic conditions and comorbid symptoms for stability, deterioration, or improvement     
Progressing  Flowsheets (Taken 10/23/2024 0800)  Free From Fall Injury: Instruct family/caregiver on patient safety     Problem: Occupational Therapy - Adult  Goal: By Discharge: Performs self-care activities at highest level of function for planned discharge setting.  See evaluation for individualized goals.  Description: FUNCTIONAL STATUS PRIOR TO ADMISSION: Patient reports independence with ADLs and mobility without device.      HOME SUPPORT: Patient is homeless but has resided at the [facility] for approximately 4 months, shelter provides meals and facilities for ADL engagement.     Occupational Therapy Goals  Initiated 10/22/2024   1.  Patient will perform grooming in standing for 3 minutes with Contact Guard Assist within 7 day(s).  2.  Patient will perform upper body dressing with Contact Guard Assist within 7 day(s).  3.  Patient will perform lower body dressing with Minimal Assist within 7 day(s).  4.  Patient will perform toilet transfers with Contact Guard Assist within 7 day(s).  5.  Patient will perform all aspects of toileting with Minimal Assist within 7 day(s).  6.  Patient will participate in upper extremity therapeutic exercise/activities with Contact Guard Assist within 7 day(s).    7.  Patient will utilize energy conservation techniques during functional activities with verbal cues within 7 day(s).  8.  Patient will improve their Fugl Almaraz score by 5 points in prep for ADLs within 7 days.    10/23/2024 1605 by Aysha Henriquez, TRANG  Outcome: Progressing     Problem: Physical Therapy - Adult  Goal: By Discharge: Performs mobility at highest level of function for planned discharge setting.  See evaluation for individualized goals.  Description: FUNCTIONAL STATUS PRIOR TO ADMISSION: Patient was independent and active without use of DME.    HOME SUPPORT PRIOR TO ADMISSION:  Patient is homeless but has resided at the [facility] for approximately 4 months, shelter provides meals and facilities 
overall improvement and discharge   Collaborate with multidisciplinary team to address chronic and comorbid conditions and prevent exacerbation or deterioration     
Education  Education Given To: Patient  Education Provided: Plan of Care  Barriers to Learning: None  Education Outcome: Verbalized understanding      JAIRO GRAY, PTA  Minutes: 20    
Outcome: Verbalized understanding      JAIRO GRAY, PTA  Minutes: 18    
assistance;Additional time;Adaptive equipment;Assist X1 (safety concerns with lowering head to support surface)  Transfers:  Transfer Training  Transfer Training: Yes  Overall Level of Assistance: Minimum assistance;Assist X1;Additional time;Adaptive equipment (cues to push from support surface- unsuccessful (pt pulling from RW))  Interventions: Safety awareness training;Verbal cues;Visual cues  Sit to Stand: Minimum assistance;Assist X1;Additional time;Adaptive equipment  Stand to Sit: Minimum assistance;Assist X1;Additional time;Adaptive equipment (cues for alignment, reach back, controlled descent)  Bed to Chair: Minimum assistance;Assist X1;Additional time;Adaptive equipment  Balance:  Balance  Sitting: Impaired  Sitting - Static: Fair (occasional) (pt with lean to right and poor righting to midline noted)  Sitting - Dynamic: Fair (occasional);Poor (constant support)  Standing: Impaired;With support  Standing - Static: Constant support;Fair  Standing - Dynamic: Constant support;Fair   Ambulation/Gait Training:  Gait: pt practiced reading door signs/finding objects on his left and cues required for scanning his environment while ambulating- cervical spine oriented to right at rest  Gait Training: Yes  Overall Level of Assistance: Minimum assistance;Assist X1;Adaptive equipment  Distance (ft): 45 Feet (x3 with standing rest breaks and safety cues to maintain BUEs on handle support)  Assistive Device: Gait belt;Walker, rolling  Interventions: Safety awareness training;Verbal cues  Base of Support: Narrowed;Center of gravity altered  Speed/Jen: Slow  Step Length: Right shortened;Left shortened  Stance: Right increased;Left decreased  Gait Abnormalities: Path deviations;Decreased step clearance;Ataxic;Trunk sway increased  Pain Rating:  NA    Activity Tolerance:   Good, Fair , requires rest breaks, SpO2 stable on room air, and VSS/ NAD    After treatment:   Patient left in no apparent distress in bed, Call bell 
lean)  Standing - Dynamic: Good;Fair      ADL Intervention:         Additional Comments: patient completed cone gathering task from high/low levels, continues to show L sided inattention requiring cues for safety and obstacle navigation; able to successfully gather 12/16 cones with CGA          Additional Comments: patient completed cone gathering task from high/low levels, continues to show L sided inattention requiring cues for safety and obstacle navigation; able to successfully gather 12/16 cones with CGA  Skin Care: Chlorhexidine wipes    Pain Rating:  Patient did not endorse pain      Activity Tolerance:   Fair  and requires rest breaks  Please refer to the flowsheet for vital signs taken during this treatment.    After treatment:   Patient left in no apparent distress sitting up in chair, Call bell within reach, and Bed/ chair alarm activated    COMMUNICATION/EDUCATION:   The patient's plan of care was discussed with: occupational therapist and registered nurse         Thank you for this referral.  Fe Salas OT  Minutes: 23    
risk                                                                                                                                                                                                                               Pain Ratin/10   0    Activity Tolerance:   Good    After treatment:   Patient left in no apparent distress sitting up in chair, Call bell within reach, and Bed/ chair alarm activated    COMMUNICATION/EDUCATION:   The patient's plan of care was discussed with: occupational therapist and registered nurse    Patient Education  Education Given To: Patient  Education Provided: Role of Therapy;Plan of Care;Precautions  Education Method: Verbal  Barriers to Learning: None  Education Outcome: Verbalized understanding    Thank you for this referral.  Rosa De La Cruz, PT  Minutes: 15      Physical Therapy Evaluation Charge Determination   History Examination Presentation Decision-Making   LOW Complexity : Zero comorbidities / personal factors that will impact the outcome / POC LOW Complexity : 1-2 Standardized tests and measures addressing body structure, function, activity limitation and / or participation in recreation  LOW Complexity : Stable, uncomplicated  AM-PAC  LOW    Based on the above components, the patient evaluation is determined to be of the following complexity level: Low

## 2024-10-28 NOTE — DISCHARGE INSTRUCTIONS
about.    DISPOSITION:    Home With:   OT  PT  HH  RN      x SNF/Inpatient Rehab/LTAC    Independent/assisted living    Hospice    Other:       PATIENT CONDITION AT DISCHARGE:     Functional status    Poor    x Deconditioned     Independent      Cognition    x Lucid     Forgetful     Dementia      Catheters/lines (plus indication)    Mcfarland     PICC     PEG    x None      Code status    x Full code     DNR      PHYSICAL EXAMINATION AT DISCHARGE:  Please see progress note      CHRONIC MEDICAL DIAGNOSES:  [unfilled]      CD Checked:   Yes x     PROBLEM LIST Updated:  Yes x         Signed:   Sahil Crzu MD  10/28/2024  3:39 PM

## 2024-10-28 NOTE — DISCHARGE SUMMARY
Hypertensive emergency  - BP much improved s/p nicardipine gtt 10/21-10/22  - not on oral antihypertensive at this time except for prn IV labetalol  -  add amlodipine, losartan     PENDING TEST RESULTS:   At the time of discharge the following test results are still pending: none    FOLLOW UP APPOINTMENTS:    PCP  Neurology       ADDITIONAL CARE RECOMMENDATIONS: as above    DIET: cardiac diet    ACTIVITY: activity as tolerated    DISCHARGE MEDICATIONS:   See Medication Reconciliation Form      NOTIFY YOUR PHYSICIAN FOR ANY OF THE FOLLOWING:   Fever over 101 degrees for 24 hours.   Chest pain, shortness of breath, fever, chills, nausea, vomiting, diarrhea, change in mentation, falling, weakness, bleeding. Severe pain or pain not relieved by medications.  Or, any other signs or symptoms that you may have questions about.    DISPOSITION:    Home With:   OT  PT  HH  RN      x SNF/Inpatient Rehab/LTAC    Independent/assisted living    Hospice    Other:       PATIENT CONDITION AT DISCHARGE:     Functional status    Poor    x Deconditioned     Independent      Cognition    x Lucid     Forgetful     Dementia      Catheters/lines (plus indication)    Mcfarland     PICC     PEG    x None      Code status    x Full code     DNR      PHYSICAL EXAMINATION AT DISCHARGE:  Please see progress note        DISCHARGE MEDICATIONS:     Medication List        START taking these medications      amLODIPine 5 MG tablet  Commonly known as: NORVASC  Take 1 tablet by mouth daily  Start taking on: October 29, 2024     aspirin 81 MG EC tablet  Take 1 tablet by mouth daily     atorvastatin 40 MG tablet  Commonly known as: LIPITOR  Take 1 tablet by mouth nightly     clonazePAM 0.5 MG tablet  Commonly known as: KlonoPIN  Take 1 tablet by mouth 2 times daily for 10 days. Max Daily Amount: 1 mg     losartan 25 MG tablet  Commonly known as: COZAAR  Take 1 tablet by mouth daily  Start taking on: October 29, 2024               Where to Get Your

## 2024-10-29 NOTE — PROGRESS NOTES
Neurology Progress Note     NAME: Jason Soliman   :  1950   MRN:  [id number]   DATE:  10/23/2024    Assessment:     Principal Problem:    Thalamic hemorrhage (HCC)  Active Problems:    Nontraumatic intracerebral hemorrhage (HCC)    Ischemic stroke (HCC)    Uncontrolled hypertension    Hyperlipidemia  Resolved Problems:    * No resolved hospital problems. *    Patient is a 74 year-old R-handed male with history of untreated HTN, prediabetes, smoker of 3-4 cigarettes per day who was admitted 10/21/24 after having seizure-like activity which was seen at the homeless shelter and was described as being stiff and altered intermittently. There was no post-ictal period after and patient was still able to talk to staff. Per report, his BP was elevated at the shelter and he was advised to go to the ED due to this. BP here 171/114. Patient also reported random falls since 10/20/24. CT head with small L thalamic hemorrhage which was stable on repeat CT head. MRI brain with L thalamic ICH and acute infarct to R corona radiata extending to basal ganglia and internal capsule. There is also severe CIWM and multiple chronic infarcts. EKG with sinus tachycardia. TTE EF 55-60% and no PFO on bubble study. UA/UDS negative. Routine EEG negative.   .4, A1c 5.7, Cr 1.32.     R Vivar radiata acute infarct and L thalamic ICH due to hemorrhagic conversion of infarct versus uncontrolled hypertension  Plan:   - Ok to start aspirin 81 mg for acute infarct on 10/28/24  - Continue atorvastatin 40 mg for goal LDL <70  - Treatment of prediabetes per primary team  - SBP goal <140   - TTE as above  - EEG negative for seizures  - Needs CTA head/neck to eval for stenosis as cause of strokes. If negative, will need outpatient cardiac monitoring   - PT/OT following   - Discussed no driving with the 
                                                                                                                                                   Neurology Progress Note     NAME: Jason Soliman   :  1950   MRN:  [id number]   DATE:  10/24/2024    Assessment:     Principal Problem:    Thalamic hemorrhage (HCC)  Active Problems:    Nontraumatic intracerebral hemorrhage (HCC)    Cerebrovascular accident (CVA) (HCC)    Hypertension    Hyperlipidemia  Resolved Problems:    * No resolved hospital problems. *    Patient is a 74 year-old R-handed male with history of untreated HTN, prediabetes, smoker of 3-4 cigarettes per day who was admitted 10/21/24 after having seizure-like activity which was seen at the homeless shelter and was described as being stiff and altered intermittently. There was no post-ictal period after and patient was still able to talk to staff. Per report, his BP was elevated at the shelter and he was advised to go to the ED due to this. BP here 171/114. Patient also reported random falls since 10/20/24. CT head with small L thalamic hemorrhage which was stable on repeat CT head. MRI brain with L thalamic ICH and acute infarct to R corona radiata extending to basal ganglia and internal capsule. There is also severe CIWM and multiple chronic infarcts. EKG with sinus tachycardia. TTE EF 55-60% and no PFO on bubble study. UA/UDS negative. Routine EEG negative.   .4, A1c 5.7, Cr 1.32.     10/24/24:   CTA head/neck negative for stenosis or occlusion.     R Vivar radiata acute infarct and L thalamic ICH due to hemorrhagic conversion of infarct versus uncontrolled hypertension  Plan:   - Ok to start aspirin 81 mg for acute infarct on 10/28/24  - Continue atorvastatin 40 mg for goal LDL <70  - Treatment of prediabetes per primary team  - SBP goal <140   - TTE as above  - EEG negative for seizures  - CTA negative->patient needs outpatient cardiac monitoring to eval for possible cardioembolic source 
                                                                                                Hospitalist Progress Note  Artur Moore MD  Answering service: [phone] OR [phone] from in house phone        Date of Service:  10/23/2024  NAME:  Jason Soliman  :  1950  MRN:  [id number]      Admission Summary:   Jason Soliman is a 74 y.o. male with HTN and tobacco use disorder who was admitted to [facility] ICU under [facility] service from a homeless shelter on 10/21/2024 with acute left thalamic ICH, hypertensive emergency, seizure-like activity. NSGY was consulted in the ED and recommended non-surgical management with repeat CT head. Neurology was consulted. He was started on Keppra 500 BID. SLP recommended regular diet with thin liquids. Repeat head CT 10/22 showed stable L thalamic ICH. NSGY signed off today. OT recommended IPR. PT eval is pending. MRI brain wo contrast 10/22 report is pending. TTE 10/22 showed EF 55-60%, NWM, abnormal diastolic function, mild MR/TR and negative bubble study. Pt is s/p nicardipine gtt 10/21-10/22. BP has improved. HM was consulted for transfer of care today.        Interval history / Subjective:   F/u for ICH     Assessment & Plan:     Nontraumatic L thalamic intracerebral hemorrhage  Seizure-like activity  Mild dysarthria  L hemiparesis  - ICH likely due to uncontrolled HTN   - NSGY consulted and signed off  - goal SBP<140  - A1c 5.7%,  with goal <70  - repeat CT head 10/22 stable acute L thalamic ICH   - MRI brain 10/22     --IMPRESSION:     1. Stable acute intraparenchymal hemorrhage in the left thalamus extending into  the left cerebral peduncle. Stable mild surrounding edema without significant  mass effect.  2. Acute infarct in the right corona radiata extending to the right basal  ganglia and posterior limb internal capsule.  3. Severe chronic microvascular ischemic disease. Multiple small chronic  infarcts and microhemorrhages as above.    - OT recommended IPR  - PT eval 
        [facility]  Hospitalist Group                                                                                          Hospitalist Progress Note  Merlyn Koch MD  Answering service: [phone] OR [phone] from in house phone        Date of Service:  10/26/2024  NAME:  Jason Soliman  :  1950  MRN:  [id number]       Admission Summary:   Jason Soliman is a 74 y.o. male with HTN and tobacco use disorder who was admitted to [facility] ICU under [facility] service from a homeless shelter on 10/21/2024 with acute left thalamic ICH, hypertensive emergency, seizure-like activity. NSGY was consulted in the ED and recommended non-surgical management with repeat CT head. Neurology was consulted. He was started on Keppra 500 BID. SLP recommended regular diet with thin liquids. Repeat head CT 10/22 showed stable L thalamic ICH. NSGY signed off today. OT recommended IPR. PT eval is pending. MRI brain wo contrast 10/22 report is pending. TTE 10/22 showed EF 55-60%, NWM, abnormal diastolic function, mild MR/TR and negative bubble study. Pt is s/p nicardipine gtt 10/21-10/22. BP has improved. HM was consulted for transfer of care today.        Interval history / Subjective:   10/24  denies fever chest pain shortness of breath, no n/v/d  Pt is restless,      Assessment & Plan:      Nontraumatic L thalamic intracerebral hemorrhage  Seizure-like activity  Mild dysarthria  L hemiparesis  - ICH likely due to uncontrolled HTN   - NSGY consulted and signed off  - goal SBP<140  - A1c 5.7%,  with goal <70  - repeat CT head 10/22 stable acute L thalamic ICH   - MRI brain 10/22   --IMPRESSION:     1. Stable acute intraparenchymal hemorrhage in the left thalamus extending into  the left cerebral peduncle. Stable mild surrounding edema without significant  mass effect.  2. Acute infarct in the right corona radiata extending to the right basal  ganglia and posterior limb internal capsule.  3. Severe chronic microvascular ischemic 
        [facility]  Hospitalist Group                                                                                          Hospitalist Progress Note  Merlyn Koch MD  Answering service: [phone] OR [phone] from in house phone        Date of Service:  10/25/2024  NAME:  Jason Soliman  :  1950  MRN:  [id number]       Admission Summary:   Jason Soliman is a 74 y.o. male with HTN and tobacco use disorder who was admitted to [facility] ICU under [facility] service from a homeless shelter on 10/21/2024 with acute left thalamic ICH, hypertensive emergency, seizure-like activity. NSGY was consulted in the ED and recommended non-surgical management with repeat CT head. Neurology was consulted. He was started on Keppra 500 BID. SLP recommended regular diet with thin liquids. Repeat head CT 10/22 showed stable L thalamic ICH. NSGY signed off today. OT recommended IPR. PT eval is pending. MRI brain wo contrast 10/22 report is pending. TTE 10/22 showed EF 55-60%, NWM, abnormal diastolic function, mild MR/TR and negative bubble study. Pt is s/p nicardipine gtt 10/21-10/22. BP has improved. HM was consulted for transfer of care today.        Interval history / Subjective:   10/24  denies fever chest pain shortness of breath, no n/v/d  Pt is restless,      Assessment & Plan:      Nontraumatic L thalamic intracerebral hemorrhage  Seizure-like activity  Mild dysarthria  L hemiparesis  - ICH likely due to uncontrolled HTN   - NSGY consulted and signed off  - goal SBP<140  - A1c 5.7%,  with goal <70  - repeat CT head 10/22 stable acute L thalamic ICH   - MRI brain 10/22   --IMPRESSION:     1. Stable acute intraparenchymal hemorrhage in the left thalamus extending into  the left cerebral peduncle. Stable mild surrounding edema without significant  mass effect.  2. Acute infarct in the right corona radiata extending to the right basal  ganglia and posterior limb internal capsule.  3. Severe chronic microvascular ischemic 
        [facility] Adult  Hospitalist Group                                                                                          Hospitalist Progress Note  Sahil Cruz MD  Answering service: [phone] OR [phone] from in house phone        Date of Service:  10/28/2024  NAME:  Jason Soliman  :  1950  MRN:  [id number]       Admission Summary:   Jason Soliman is a 74 y.o. male with HTN and tobacco use disorder who was admitted to [facility] ICU under [facility] service from a homeless shelter on 10/21/2024 with acute left thalamic ICH, hypertensive emergency, seizure-like activity. NSGY was consulted in the ED and recommended non-surgical management with repeat CT head. Neurology was consulted. He was started on Keppra 500 BID. SLP recommended regular diet with thin liquids. Repeat head CT 10/22 showed stable L thalamic ICH. NSGY signed off today. OT recommended IPR. PT eval is pending. MRI brain wo contrast 10/22 report is pending. TTE 10/22 showed EF 55-60%, NWM, abnormal diastolic function, mild MR/TR and negative bubble study. Pt is s/p nicardipine gtt 10/21-10/22. BP has improved. HM was consulted for transfer of care today.        Interval history / Subjective:   Follow up on L thalamic intracerebral hemorrhage  Awake , answering questions appropriately   Involuntary extremity jerks upon activity noted       Assessment & Plan:      Nontraumatic L thalamic intracerebral hemorrhage  Seizure-like activity  Mild dysarthria  L hemiparesis  - ICH likely due to uncontrolled HTN   - NSGY consulted and signed off  - goal SBP<140  - A1c 5.7%,  with goal <70  - repeat CT head 10/22 stable acute L thalamic ICH   - MRI brain 10/22   --IMPRESSION:     1. Stable acute intraparenchymal hemorrhage in the left thalamus extending into  the left cerebral peduncle. Stable mild surrounding edema without significant  mass effect.  2. Acute infarct in the right corona radiata extending to the right basal  ganglia and 
        [facility] Adult  Hospitalist Group                                                                                          Hospitalist Progress Note  KENYATTA Goff - CNP  Answering service: [phone] OR [phone] from in house phone        Date of Service:  10/24/2024  NAME:  Jason Soliman  :  1950  MRN:  [id number]       Admission Summary:   Jason Soliman is a 74 y.o. male with HTN and tobacco use disorder who was admitted to [facility] ICU under [facility] service from a homeless shelter on 10/21/2024 with acute left thalamic ICH, hypertensive emergency, seizure-like activity. NSGY was consulted in the ED and recommended non-surgical management with repeat CT head. Neurology was consulted. He was started on Keppra 500 BID. SLP recommended regular diet with thin liquids. Repeat head CT 10/22 showed stable L thalamic ICH. NSGY signed off today. OT recommended IPR. PT eval is pending. MRI brain wo contrast 10/22 report is pending. TTE 10/22 showed EF 55-60%, NWM, abnormal diastolic function, mild MR/TR and negative bubble study. Pt is s/p nicardipine gtt 10/21-10/22. BP has improved. HM was consulted for transfer of care today.        Interval history / Subjective:   10/24  denies fever chest pain shortness of breath, no n/v/d     Assessment & Plan:      Nontraumatic L thalamic intracerebral hemorrhage  Seizure-like activity  Mild dysarthria  L hemiparesis  - ICH likely due to uncontrolled HTN   - NSGY consulted and signed off  - goal SBP<140  - A1c 5.7%,  with goal <70  - repeat CT head 10/22 stable acute L thalamic ICH   - MRI brain 10/22   -neuro recommended holter monitoring outpatient, cardiology consulted    --IMPRESSION:     1. Stable acute intraparenchymal hemorrhage in the left thalamus extending into  the left cerebral peduncle. Stable mild surrounding edema without significant  mass effect.  2. Acute infarct in the right corona radiata extending to the right basal  ganglia and 
    Name: Jason Soliman   : 1950   MRN: [id number]   Date: 10/22/2024      REASON FOR ICU ADMISSION: ICH     PRINCIPLE ICU DIAGNOSIS     Acute left thalamic hemorrhage  HTN emergency   Seizure-like activity   Current Smoker    PATIENT SUMMARY   Jason Soliman is a 74 y.o. male with a history of HTN     This is a 74 y.o. male with pmhx HTN who \"presents today for cc of AMS. Patient states that he has been having random falls since yesterday. Per EMS at the homeless shelter he had an episode of stiffening and acted abnormally, then was acting normal. They took his blood pressure and noted it was elevated and recommended that he come in.  Patient states that he otherwise has no complaints, no headache, chest pain.  He did not lose control of bowel or bladder during the episode and was not postictal per EMS.  He did have another fall today, but denies any his head stating \"I just fell over.\"  Denies prodrome or presyncope.  No loss of consciousness.  Takes no blood thinners.\" Head CT was obtained and reported: Acute left thalamic hemorrhage. Patient was started on Cardene drip for blood pressure control. Neurosurgery was consulted and reviewed image, no surgical intervention was recommended. Critical care was consulted for admission. Admit to ICU. Upon further interview patient sates that he has had an intermittent headache that started about a week ago. Has been taking ibuprofen for the headache, but kept coming back which was unusual for him. Currently states his headache is still present but slight 2/10 on pain scale. No other complaint of pain. No nausea or vomiting. No blood in bowel movement or urine. States that he has had chills at night, but no fever. Not recently sick or around sick contacts.     Patient states that has been told before that he has HTN but does not take any medications. States that a few times he has also been told his blood sugars were elevated but not diagnosed with any diabetes. Denies 
  ICH Documentation Note     Brief HPI: Pt resident of homeless shelter. Stiffening with AMS followed by lucid intervals. Unclear past medical hx    Medical hx      Traumatic ICH?   YES (no ICH score indicated) / NO (see ICH score)   ICH Score ON   ARRIVAL:    Arnett Tejeda/Modified Evans on arrival (if indicated):      ICH Score 0    Imaging:   CT Result (most recent):  CT HEAD WO CONTRAST 10/21/2024    Narrative  EXAM: CT HEAD WO CONTRAST    INDICATION: ams    COMPARISON: Jun.    CONTRAST: None.    TECHNIQUE: Unenhanced CT of the head was performed using 5 mm images. Brain and  bone windows were generated. Coronal and sagittal reformats. CT dose reduction  was achieved through use of a standardized protocol tailored for this  examination and automatic exposure control for dose modulation.    FINDINGS: A focal high density collection has developed in the left thalamus,  1.4 x 1.2 x 0.9 cm (volume 0.8 cc).  The ventricles and sulci are normal in size, shape and configuration.  Periventricular white matter low density is mild and diffuse, with patchy  low-density in the right basal ganglia region suggesting prior ischemia.. There  is no intracranial hemorrhage, extra-axial collection, or mass effect. The  basilar cisterns are open. No CT evidence of acute infarct.    The bone windows demonstrate no abnormalities. The visualized portions of the  paranasal sinuses and mastoid air cells are clear.    Impression  1. Acute left thalamic hemorrhage.    The findings were called to MARCELA Barahona, in the ED, on 10/21/2024 at 2035 hours  by Dr. Plascencia.  [phone]        Electronically signed by DEONDRE PLASCENCIA       Plan:  Neurology consult    NSGY consult   SBP goal <140     Time spent: 10 minutes.     Justine Brady, KENYATTA - CNP  Neurocritical Care Nurse Practitioner  [phone]   
  Physician Progress Note      PATIENT:               MICHELE HUERTA  CSN #:                  [id number]  :                       1950  ADMIT DATE:       10/21/2024 7:59 PM  DISCH DATE:  RESPONDING  PROVIDER #:        Merlyn Koch MD          QUERY TEXT:    Pt admitted with ICH.  Pt noted to have radiology finding or documentation of   mild surrounding edema.  If clinically significant, please document in   progress notes and discharge summary if you are evaluating/treating any of the   following:  The medical record reflects the following:    Risk Factors: acute left thalamic ICH, hypertensive emergency, seizure-like   activity    Clinical Indicators: MRI: Stable acute intraparenchymal hemorrhage in the left   thalamus extending into  the left cerebral peduncle. Stable mild surrounding edema without significant   mass effect.    Treatment: Neurology was consulted  -IV Keppra  -Cardene gtt    Please call if you have any questions or need assistance. I can also be   reached via [employer] or [employer] # [phone].  Thank you,  Anita Vázquez RN/CDI  Options provided:  -- Cerebral edema  -- Brain compression  -- Cerebral edema and Brain compression  -- Other - I will add my own diagnosis  -- Disagree - Not applicable / Not valid  -- Disagree - Clinically unable to determine / Unknown  -- Refer to Clinical Documentation Reviewer    PROVIDER RESPONSE TEXT:    This patient has cerebral edema and brain compression.    Query created by: Anita Vázquez on 10/25/2024 4:00 PM      Electronically signed by:  Merlyn Koch MD 10/26/2024 4:47 PM          
A Spiritual Care Partner Volunteer visited Jason Soliman at [facility] 6S NEURO-SCI TELE on 10/23/2024     Documented by: Chaplain Veronica Rinaldi  
Attended interdisciplanary rounds on 6W Peds Unit where patient care was discussed.  Pedrito Schmidt  Chaplain Resident  [phone]   
Comprehensive Nutrition Assessment    Type and Reason for Visit: Initial, Positive Nutrition Screen (low BMI)    Nutrition Recommendations/Plan:   Continue current PO diet   Pt with limited dentition but denied need for altered texture diet   Food preferences: LIKES: Rice at lunch and dinner, prefers sausage at breakfast   Continue High Protein/High Calorie ONS 2x/d, d/c magic cup    RD provided with food insecurity handout, pt stated he could not read it at this time d/t not having his glasses but was thankful.     Please obtain updated measured height and weight       Malnutrition Assessment:  Malnutrition Status:  At risk for malnutrition (Comment) (housing instability) (10/23/24 3327)    Context:  Social/Environmental Circumstances     Findings of the 6 clinical characteristics of malnutrition:  Energy Intake:  Mild decrease in energy intake (Comment)  Weight Loss:  Unable to assess     Body Fat Loss:  No significant body fat loss     Muscle Mass Loss:  No significant muscle mass loss (pt appears small but has good retained muscle)    Fluid Accumulation:  No significant fluid accumulation     Strength:  Not Performed       Nutrition Assessment:    74 y.o. male admitted for  falls r/t thalamic hemorrhage, in ICU x1d, neurology following. PMH significant for HTN, hx tobacco use. No wt hx to assess.     RD assessment for low BMI. Chart reviewed, noted pt is currently without stable housing, lives at [facility]4 [facility]. IP wt hx variable, ranged from 113-145#. Interviewed at bedside, observed >75% of B consumed (noted intakes documented >50% in I/O as well). Pt reports he has SNAP benefits and typically gets food from [employer] or a gas station, does not know his UBW. Obtained food preferences, pt referenced his upbringing in [place] and stated he typically does not eat a lot as food was also scarce in his homeland of [place]. RD encouraged PO intakes and meals and ONS while IP to help build 
Holter monitor already in place (by EKG staff).   
Neurosurgery    CT head without contrast with stable thalamic ICH. No surgical intervention. Will be available as needed. Please call with questions.    MARGUERITE Lew NP  
OT services deferred this AM- patient declined OT. Patient reports that he feels bad, he has no appetite for breakfast, and wanted Hob lowered to rest. Nurse made aware. Will follow up later as time allows. OT POC unchanged.   Babs Freitas, OTR/L  
Patient taken to [facility] with [facility]. IV removed. Stroke education completed with patient. Called and tried to give report. Brenda said she would call me back.   
Renal Dosing/Monitoring  Medication: famotidine   Current regimen:  20 every 12 hr  Recent Labs     10/21/24  1613   CREATININE 1.32*   BUN 14     Estimated CrCl:  47 ml/min  Plan: Change to famotidine 20 mg QHS for crcl < 50 ml/min    
rEEG complete  
Financial abuse: Denies     Sexual abuse: Denies   Utilities: Not on file       Review of Systems:   Pertinent items are noted in HPI.       Vital Signs:    Last 24hrs VS reviewed since prior progress note. Most recent are:  Vitals:    10/27/24 2201   BP: (!) 154/101   Pulse: 99   Resp:    Temp: 98 °F (36.7 °C)   SpO2:          Intake/Output Summary (Last 24 hours) at 10/27/2024 2235  Last data filed at 10/27/2024 1303  Gross per 24 hour   Intake --   Output 1000 ml   Net -1000 ml        Physical Examination:     I had a face to face encounter with this patient and independently examined them on 10/27/2024 as outlined below:          General : alert x 3, awake, no acute distress, restless Mild dysarthria   HEENT: PEERL, EOMI, moist mucus membrane, TM clear  Neck: supple, no JVD, no meningeal signs  Chest: Clear to auscultation bilaterally   CVS: S1 S2 heard, Capillary refill less than 2 seconds  Abd: soft/ non tender, non distended, BS physiological,   Ext: no clubbing, no cyanosis, no edema, brisk 2+ DP pulses  Neuro/Psych: pleasant mood and affect, CN 2-12 grossly intact,5/5 bilat   Skin: warm     Data Review:    Review and/or order of clinical lab test      I have personally and independently reviewed all pertinent labs, diagnostic studies, imaging, and have provided independent interpretation of the same.     Labs:     No results for input(s): \"WBC\", \"HGB\", \"HCT\", \"PLT\" in the last 72 hours.    No results for input(s): \"NA\", \"K\", \"CL\", \"CO2\", \"BUN\", \"GLU\", \"MG\", \"PHOS\" in the last 72 hours.    Invalid input(s): \"CREA\", \"CA\", \"URICA\"    No results for input(s): \"ALT\", \"TP\", \"GLOB\", \"GGT\" in the last 72 hours.    Invalid input(s): \"SGOT\", \"GPT\", \"AP\", \"TBIL\", \"TBILI\", \"ALB\", \"AML\", \"AMYP\", \"LPSE\", \"HLPSE\"    No results for input(s): \"INR\", \"APTT\" in the last 72 hours.    Invalid input(s): \"PTP\"     No results for input(s): \"TIBC\" in the last 72 hours.    Invalid input(s): \"FE\", \"PSAT\", \"FERR\"   No results found 
maxillary sinus. The mastoid air cells and  middle ears are clear. The orbital contents are within normal limits. No  significant osseous or scalp lesions are identified.    Impression  1. Stable acute intraparenchymal hemorrhage in the left thalamus extending into  the left cerebral peduncle. Stable mild surrounding edema without significant  mass effect.  2. Acute infarct in the right corona radiata extending to the right basal  ganglia and posterior limb internal capsule.  3. Severe chronic microvascular ischemic disease. Multiple small chronic  infarcts and microhemorrhages as above.      Electronically signed by Anam Villanueva    CT Result (most recent):  CTA HEAD NECK W CONTRAST 10/23/2024    Narrative  EXAM: CTA HEAD NECK W CONTRAST    INDICATION: eval for stenosis/occlusion, MRI with bilateral subcortical infarcts    EXAMINATION:  CT ANGIOGRAPHY HEAD AND NECK    COMPARISON: October 22, 2024    TECHNIQUE:  Following the uneventful administration of iodinated contrast  material, axial CT angiography of the head and neck was performed. Delayed axial  images through the head were also obtained. Coronal and sagittal reconstructions  were obtained. Manual postprocessing of images was performed. 3-D  Sagittal  maximal intensity projection images were obtained.  3-D Coronal maximal  intensity projections were obtained. CT dose reduction was achieved through use  of a standardized protocol tailored for this examination and automatic exposure  control for dose modulation. This study was analyzed by the [email or web address] algorithm.      FINDINGS:    Delayed contrast-enhanced head CT:    Sequela of chronic microvascular angiopathy in the periventricular white matter.  11 mm left thalamic hemorrhage again seen. No subarachnoid hemorrhage. No  midline shift.  CTA NECK:    Great vessels: Normal arch anatomy with the origins patent.    Right subclavian artery: Patent    Left subclavian artery: Patent    Right common carotid artery: